# Patient Record
Sex: FEMALE | Race: WHITE | Employment: OTHER | URBAN - METROPOLITAN AREA
[De-identification: names, ages, dates, MRNs, and addresses within clinical notes are randomized per-mention and may not be internally consistent; named-entity substitution may affect disease eponyms.]

---

## 2017-06-21 ENCOUNTER — HOSPITAL ENCOUNTER (OUTPATIENT)
Dept: PREADMISSION TESTING | Age: 72
Discharge: HOME | End: 2017-06-21
Payer: MEDICARE

## 2017-06-21 VITALS
WEIGHT: 148.25 LBS | SYSTOLIC BLOOD PRESSURE: 157 MMHG | OXYGEN SATURATION: 97 % | HEIGHT: 62 IN | BODY MASS INDEX: 27.28 KG/M2 | RESPIRATION RATE: 18 BRPM | HEART RATE: 90 BPM | DIASTOLIC BLOOD PRESSURE: 76 MMHG | TEMPERATURE: 98.4 F

## 2017-06-21 LAB
ALBUMIN SERPL BCP-MCNC: 4 G/DL (ref 3.5–5)
ALBUMIN/GLOB SERPL: 1.4 {RATIO} (ref 1.1–2.2)
ALP SERPL-CCNC: 35 U/L (ref 45–117)
ALT SERPL-CCNC: 31 U/L (ref 12–78)
ANION GAP BLD CALC-SCNC: 9 MMOL/L (ref 5–15)
APPEARANCE UR: CLEAR
APTT PPP: 27 SEC (ref 22.1–32.5)
AST SERPL W P-5'-P-CCNC: 23 U/L (ref 15–37)
ATRIAL RATE: 57 BPM
BACTERIA URNS QL MICRO: NEGATIVE /HPF
BASOPHILS # BLD AUTO: 0.1 K/UL (ref 0–0.1)
BASOPHILS # BLD: 1 % (ref 0–1)
BILIRUB SERPL-MCNC: 0.7 MG/DL (ref 0.2–1)
BILIRUB UR QL: NEGATIVE
BUN SERPL-MCNC: 15 MG/DL (ref 6–20)
BUN/CREAT SERPL: 16 (ref 12–20)
CALCIUM SERPL-MCNC: 9.5 MG/DL (ref 8.5–10.1)
CALCULATED P AXIS, ECG09: 42 DEGREES
CALCULATED R AXIS, ECG10: 2 DEGREES
CALCULATED T AXIS, ECG11: 43 DEGREES
CHLORIDE SERPL-SCNC: 106 MMOL/L (ref 97–108)
CO2 SERPL-SCNC: 30 MMOL/L (ref 21–32)
COLOR UR: ABNORMAL
CREAT SERPL-MCNC: 0.95 MG/DL (ref 0.55–1.02)
DIAGNOSIS, 93000: NORMAL
EOSINOPHIL # BLD: 0.2 K/UL (ref 0–0.4)
EOSINOPHIL NFR BLD: 3 % (ref 0–7)
EPITH CASTS URNS QL MICRO: ABNORMAL /LPF
ERYTHROCYTE [DISTWIDTH] IN BLOOD BY AUTOMATED COUNT: 13.5 % (ref 11.5–14.5)
EST. AVERAGE GLUCOSE BLD GHB EST-MCNC: 137 MG/DL
GLOBULIN SER CALC-MCNC: 2.8 G/DL (ref 2–4)
GLUCOSE SERPL-MCNC: 91 MG/DL (ref 65–100)
GLUCOSE UR STRIP.AUTO-MCNC: NEGATIVE MG/DL
HBA1C MFR BLD: 6.4 % (ref 4.2–6.3)
HCT VFR BLD AUTO: 39.2 % (ref 35–47)
HGB BLD-MCNC: 12.8 G/DL (ref 11.5–16)
HGB UR QL STRIP: NEGATIVE
INR PPP: 1 (ref 0.9–1.1)
KETONES UR QL STRIP.AUTO: 15 MG/DL
LEUKOCYTE ESTERASE UR QL STRIP.AUTO: NEGATIVE
LYMPHOCYTES # BLD AUTO: 29 % (ref 12–49)
LYMPHOCYTES # BLD: 2.3 K/UL (ref 0.8–3.5)
MCH RBC QN AUTO: 28.3 PG (ref 26–34)
MCHC RBC AUTO-ENTMCNC: 32.7 G/DL (ref 30–36.5)
MCV RBC AUTO: 86.5 FL (ref 80–99)
MONOCYTES # BLD: 0.8 K/UL (ref 0–1)
MONOCYTES NFR BLD AUTO: 11 % (ref 5–13)
NEUTS SEG # BLD: 4.4 K/UL (ref 1.8–8)
NEUTS SEG NFR BLD AUTO: 56 % (ref 32–75)
NITRITE UR QL STRIP.AUTO: NEGATIVE
P-R INTERVAL, ECG05: 188 MS
PH UR STRIP: 5.5 [PH] (ref 5–8)
PLATELET # BLD AUTO: 293 K/UL (ref 150–400)
POTASSIUM SERPL-SCNC: 4.4 MMOL/L (ref 3.5–5.1)
PROT SERPL-MCNC: 6.8 G/DL (ref 6.4–8.2)
PROT UR STRIP-MCNC: NEGATIVE MG/DL
PROTHROMBIN TIME: 10.3 SEC (ref 9–11.1)
Q-T INTERVAL, ECG07: 430 MS
QRS DURATION, ECG06: 76 MS
QTC CALCULATION (BEZET), ECG08: 418 MS
RBC # BLD AUTO: 4.53 M/UL (ref 3.8–5.2)
RBC #/AREA URNS HPF: ABNORMAL /HPF (ref 0–5)
SODIUM SERPL-SCNC: 145 MMOL/L (ref 136–145)
SP GR UR REFRACTOMETRY: 1.02 (ref 1–1.03)
THERAPEUTIC RANGE,PTTT: NORMAL SECS (ref 58–77)
UA: UC IF INDICATED,UAUC: ABNORMAL
UROBILINOGEN UR QL STRIP.AUTO: 0.2 EU/DL (ref 0.2–1)
VENTRICULAR RATE, ECG03: 57 BPM
WBC # BLD AUTO: 7.8 K/UL (ref 3.6–11)
WBC URNS QL MICRO: ABNORMAL /HPF (ref 0–4)

## 2017-06-21 PROCEDURE — 80053 COMPREHEN METABOLIC PANEL: CPT | Performed by: ORTHOPAEDIC SURGERY

## 2017-06-21 PROCEDURE — 83036 HEMOGLOBIN GLYCOSYLATED A1C: CPT | Performed by: ORTHOPAEDIC SURGERY

## 2017-06-21 PROCEDURE — 85730 THROMBOPLASTIN TIME PARTIAL: CPT | Performed by: ORTHOPAEDIC SURGERY

## 2017-06-21 PROCEDURE — 36415 COLL VENOUS BLD VENIPUNCTURE: CPT | Performed by: ORTHOPAEDIC SURGERY

## 2017-06-21 PROCEDURE — 93005 ELECTROCARDIOGRAM TRACING: CPT

## 2017-06-21 PROCEDURE — 81001 URINALYSIS AUTO W/SCOPE: CPT | Performed by: ORTHOPAEDIC SURGERY

## 2017-06-21 PROCEDURE — 85025 COMPLETE CBC W/AUTO DIFF WBC: CPT | Performed by: ORTHOPAEDIC SURGERY

## 2017-06-21 PROCEDURE — 85610 PROTHROMBIN TIME: CPT | Performed by: ORTHOPAEDIC SURGERY

## 2017-06-21 RX ORDER — MELOXICAM 15 MG/1
15 TABLET ORAL DAILY
COMMUNITY
End: 2017-07-12

## 2017-06-21 RX ORDER — CHOLECALCIFEROL (VITAMIN D3) 125 MCG
2000 CAPSULE ORAL DAILY
COMMUNITY

## 2017-06-21 RX ORDER — ROSUVASTATIN CALCIUM 5 MG/1
5 TABLET, COATED ORAL DAILY
COMMUNITY

## 2017-06-21 NOTE — H&P
PAT Pre-Op History & Physical    Patient: Marcelo Garcia                  MRN: [id number]          SSN: [id number]  YOB: 1945          Age: 71 y.o. Sex: female                Subjective:   Patient is a 71 y.o.  female who presents with history of chronic right knee pain that has been a problem for \"years\" but has escalated over the last 1-2 months. Rates her knee pain 5/10 and characterizes it as a constant ache, especially at night. States that prolonged walking exacerbates her knee pain. Has failed steroid injections, gel injections, NSAIDs, Tylenol, and application of ice. The patient was evaluated in the surgeon's office and it was determined that the most appropriate plan of care is to proceed with surgical intervention. Patient's PCP Pamela Anne MD      Past Medical History:   Diagnosis Date    Arthritis     osteopenia    Chronic pain     knees/hips    High cholesterol     Ill-defined condition 06/21/2017    Over weight  BMI= 27.1    Insomnia     Restless leg syndrome     Urinary incontinence     stress /urge      Past Surgical History:   Procedure Laterality Date    HX GI      colonoscopy    HX GYN      vaginal sling    HX HEENT      Tonsilectomy as a child    HX ORTHOPAEDIC      local anesthesia- Trigger fingers bilat hands      Prior to Admission medications    Medication Sig Start Date End Date Taking? Authorizing Provider   meloxicam (MOBIC) 15 mg tablet Take 15 mg by mouth daily. Indications: with food   Yes Historical Provider   cholecalciferol, vitamin D3, (VITAMIN D3) 2,000 unit tab Take 2,000 Units by mouth daily. Yes Historical Provider   OTHER Indications: Pt states she takes calcium not sure of dose   Yes Historical Provider   rosuvastatin (CRESTOR) 5 mg tablet Take 5 mg by mouth daily. Yes Historical Provider     Current Outpatient Prescriptions   Medication Sig    meloxicam (MOBIC) 15 mg tablet Take 15 mg by mouth daily.  Indications: with food    cholecalciferol, vitamin D3, (VITAMIN D3) 2,000 unit tab Take 2,000 Units by mouth daily.  OTHER Indications: Pt states she takes calcium not sure of dose    rosuvastatin (CRESTOR) 5 mg tablet Take 5 mg by mouth daily. No current facility-administered medications for this encounter. No Known Allergies   Social History   Substance Use Topics    Smoking status: Never Smoker    Smokeless tobacco: Never Used    Alcohol use No      History   Drug Use No     Family History   Problem Relation Age of Onset    Stroke Mother     Arthritis-osteo Father     Prostate Cancer Father     Other Sister      spinal stenosis    Arthritis-osteo Sister     Prostate Cancer Brother     Arthritis-osteo Brother     Prostate Cancer Brother     Arthritis-osteo Brother          Review of Systems    Patient denies difficulty swallowing, mouth sores, or loose teeth. Patient denies any recent dental procedures or any planned prior to surgery. Patient denies chest pain, tightness, pain radiating down left arm, palpitations. Denies dizziness, visual disturbances, or lightheadedness. Patient denies shortness of breath, wheezing, cough, fever, or chills. Patient denies diarrhea, constipation, or abdominal pain. Patient denies urinary problems including dysuria, hesitancy, urgency, or incontinence. Denies skin breakdown, rashes, insect bites or open area. C/o right knee pain. Objective:   Patient Vitals for the past 24 hrs:   Temp Pulse Resp BP SpO2   06/21/17 0940 98.4 °F (36.9 °C) 90 18 157/76 97 %       Body mass index is 27.12 kg/(m^2). Wt Readings from Last 1 Encounters:   06/21/17 67.2 kg (148 lb 4 oz)        Physical Exam:     General: Pleasant,  cooperative, no apparent distress, appears stated age. Eyes: Conjunctivae/corneas clear. EOMs intact. Nose: Nares normal.   Mouth/Throat: Lips, mucosa, and tongue normal. Teeth and gums normal.   Neck: Supple, symmetrical, trachea midline.     Back: Symmetric   Lungs: Clear to auscultation bilaterally. Heart: Regular rate and rhythm, S1, S2 normal. No murmur, click, rub or gallop. Abdomen: Soft, non-tender. Bowel sounds normal. No distention. Musculoskeletal:  Gait is antalgic. + crepitus right knee. Extremities:  Extremities normal, atraumatic, no cyanosis or edema. Calves                                 supple, non tender to palpation. Pulses: 2+ and symmetric bilateral upper extremities. Cap. refill <2 seconds   Skin: Skin color, texture, turgor normal.  No rashes or lesions. Neurologic: CN II-XII grossly intact. Alert and oriented x3. Labs:   Recent Results (from the past 72 hour(s))   EKG, 12 LEAD, INITIAL    Collection Time: 06/21/17 12:17 PM   Result Value Ref Range    Ventricular Rate 57 BPM    Atrial Rate 57 BPM    P-R Interval 188 ms    QRS Duration 76 ms    Q-T Interval 430 ms    QTC Calculation (Bezet) 418 ms    Calculated P Axis 42 degrees    Calculated R Axis 2 degrees    Calculated T Axis 43 degrees    Diagnosis       Sinus bradycardia  Otherwise normal ECG  No previous ECGs available  Confirmed by Juanito Robles MD ([id number]) on 6/21/2017 3:14:00 PM     CBC WITH AUTOMATED DIFF    Collection Time: 06/21/17 12:57 PM   Result Value Ref Range    WBC 7.8 3.6 - 11.0 K/uL    RBC 4.53 3.80 - 5.20 M/uL    HGB 12.8 11.5 - 16.0 g/dL    HCT 39.2 35.0 - 47.0 %    MCV 86.5 80.0 - 99.0 FL    MCH 28.3 26.0 - 34.0 PG    MCHC 32.7 30.0 - 36.5 g/dL    RDW 13.5 11.5 - 14.5 %    PLATELET 972 107 - 216 K/uL    NEUTROPHILS 56 32 - 75 %    LYMPHOCYTES 29 12 - 49 %    MONOCYTES 11 5 - 13 %    EOSINOPHILS 3 0 - 7 %    BASOPHILS 1 0 - 1 %    ABS. NEUTROPHILS 4.4 1.8 - 8.0 K/UL    ABS. LYMPHOCYTES 2.3 0.8 - 3.5 K/UL    ABS. MONOCYTES 0.8 0.0 - 1.0 K/UL    ABS. EOSINOPHILS 0.2 0.0 - 0.4 K/UL    ABS.  BASOPHILS 0.1 0.0 - 0.1 K/UL   METABOLIC PANEL, COMPREHENSIVE    Collection Time: 06/21/17 12:57 PM   Result Value Ref Range    Sodium 145 136 - 145 mmol/L Potassium 4.4 3.5 - 5.1 mmol/L    Chloride 106 97 - 108 mmol/L    CO2 30 21 - 32 mmol/L    Anion gap 9 5 - 15 mmol/L    Glucose 91 65 - 100 mg/dL    BUN 15 6 - 20 MG/DL    Creatinine 0.95 0.55 - 1.02 MG/DL    BUN/Creatinine ratio 16 12 - 20      GFR est AA >60 >60 ml/min/1.73m2    GFR est non-AA 58 (L) >60 ml/min/1.73m2    Calcium 9.5 8.5 - 10.1 MG/DL    Bilirubin, total 0.7 0.2 - 1.0 MG/DL    ALT (SGPT) 31 12 - 78 U/L    AST (SGOT) 23 15 - 37 U/L    Alk.  phosphatase 35 (L) 45 - 117 U/L    Protein, total 6.8 6.4 - 8.2 g/dL    Albumin 4.0 3.5 - 5.0 g/dL    Globulin 2.8 2.0 - 4.0 g/dL    A-G Ratio 1.4 1.1 - 2.2     HEMOGLOBIN A1C WITH EAG    Collection Time: 06/21/17 12:57 PM   Result Value Ref Range    Hemoglobin A1c 6.4 (H) 4.2 - 6.3 %    Est. average glucose 137 mg/dL   CULTURE, MRSA    Collection Time: 06/21/17 12:57 PM   Result Value Ref Range    Special Requests: NO SPECIAL REQUESTS      Culture result: MRSA NOT PRESENT  AT 13 HOURS       PROTHROMBIN TIME + INR    Collection Time: 06/21/17 12:57 PM   Result Value Ref Range    INR 1.0 0.9 - 1.1      Prothrombin time 10.3 9.0 - 11.1 sec   PTT    Collection Time: 06/21/17 12:57 PM   Result Value Ref Range    aPTT 27.0 22.1 - 32.5 sec    aPTT, therapeutic range     58.0 - 77.0 SECS   URINALYSIS W/ REFLEX CULTURE    Collection Time: 06/21/17 12:57 PM   Result Value Ref Range    Color YELLOW/STRAW      Appearance CLEAR CLEAR      Specific gravity 1.021 1.003 - 1.030      pH (UA) 5.5 5.0 - 8.0      Protein NEGATIVE  NEG mg/dL    Glucose NEGATIVE  NEG mg/dL    Ketone 15 (A) NEG mg/dL    Bilirubin NEGATIVE  NEG      Blood NEGATIVE  NEG      Urobilinogen 0.2 0.2 - 1.0 EU/dL    Nitrites NEGATIVE  NEG      Leukocyte Esterase NEGATIVE  NEG      WBC 0-4 0 - 4 /hpf    RBC 0-5 0 - 5 /hpf    Epithelial cells FEW FEW /lpf    Bacteria NEGATIVE  NEG /hpf    UA:UC IF INDICATED CULTURE NOT INDICATED BY UA RESULT CNI         Assessment:     DJD    Plan:     Scheduled for Right press fit total knee arthroplasty. Labs and EKG done per surgeon's orders. LAb results and EKG reviewed- unremarkable. MRSA pending. Attended joint class 6/21/2017.     Karol Sol NP

## 2017-06-21 NOTE — PERIOP NOTES
[facility]  PREOPERATIVE INSTRUCTIONS    Surgery Date:   7/10/17  Surgery arrival time given by surgeon: NO   If no,[facility] [address] staff will call you between 4 PM- 8 PM the day before surgery with your arrival time. If your surgery is on a Monday, we will call you the preceding Friday. Please call [phone] after 8 PM if you did not receive your arrival time. 1. Please report at the designated time to the [facility] [facility]. Bring your insurance card, photo identification, and any copayment ( if applicable). 2. You must have a responsible adult to drive you home. You need to have a responsible adult to stay with you the first 24 hours after surgery if you are going home the same day of your surgery and you should not drive a car for 24 hours following your surgery. 3. Nothing to eat or drink after midnight the night before surgery. This includes no water, gum, mints, coffee, juice, etc.  Please note special instructions, if applicable, below for medications. 4. MEDICATIONS TO TAKE THE MORNING OF SURGERY WITH A SIP OF WATER:  5. No alcoholic beverages 24 hours before or after your surgery. 6. If you are being admitted to the hospital,please leave personal belongings/luggage in your car until you have an assigned hospital room number. 7. Stop Aspirin and/or any non-steroidal anti-inflammatory drugs (i.e. Ibuprofen, Naproxen, Advil, Aleve) as directed by your surgeon. You may take Tylenol. Stop herbal supplements 1 week prior to  surgery. 8. If you are currently taking Plavix, Coumadin,or any other blood-thinning/anticoagulant medication contact your surgeon for instructions. 9. Please wear comfortable clothes. Wear your glasses instead of contacts. We ask that all money, jewelry and valuables be left at home. Wear no make up, particularly mascara, the day of surgery. 10.  All body piercings, rings,and jewelry need to be removed and left at home. Please wear your hair loose or down.  Please no pony-tails, buns, or any metal hair accessories. If you shower the morning of surgery, please do not apply any lotions, powders, or deodorants afterwards. Do not shave any body area within 24 hours of your surgery. 11. Please follow all instructions to avoid any potential surgical cancellation. 12.  Should your physical condition change, (i.e. fever, cold, flu, etc.) please notify your surgeon as soon as possible. 13. It is important to be on time. If a situation occurs where you may be delayed, please call:  [phone] / [phone] on the day of surgery. 14. The Preadmission Testing staff can be reached at [phone]. .  15. Special instructions:bring calcium bottle so nurse can verify    The patient was contacted  in person. She  verbalize  understanding of all instructions does not  need reinforcement.

## 2017-06-22 LAB
BACTERIA SPEC CULT: NORMAL
BACTERIA SPEC CULT: NORMAL
SERVICE CMNT-IMP: NORMAL

## 2017-06-22 NOTE — PROGRESS NOTES
Problem: Patient Education: Go to Patient Education Activity  Goal: Patient/Family Education  Outcome: Progressing Towards Goal  The patient attended the pre-operative joint replacement class. The content of the class, using a power-point presentation as well as visual demonstration was specific for patients undergoing total knee and total hip replacements. A pre-operative education booklet was provided to all patients. At the conclusion of class an opportunity was offered for any question or concerns the patient or family may have regarding their upcoming scheduled procedure. Patient attended class on 6/21/17, no  present.

## 2017-07-10 ENCOUNTER — ANESTHESIA EVENT (OUTPATIENT)
Dept: SURGERY | Age: 72
DRG: 470 | End: 2017-07-10
Payer: MEDICARE

## 2017-07-10 ENCOUNTER — APPOINTMENT (OUTPATIENT)
Dept: GENERAL RADIOLOGY | Age: 72
DRG: 470 | End: 2017-07-10
Attending: ORTHOPAEDIC SURGERY
Payer: MEDICARE

## 2017-07-10 ENCOUNTER — ANESTHESIA (OUTPATIENT)
Dept: SURGERY | Age: 72
DRG: 470 | End: 2017-07-10
Payer: MEDICARE

## 2017-07-10 ENCOUNTER — HOSPITAL ENCOUNTER (INPATIENT)
Age: 72
LOS: 2 days | Discharge: HOME | DRG: 470 | End: 2017-07-12
Attending: ORTHOPAEDIC SURGERY | Admitting: ORTHOPAEDIC SURGERY
Payer: MEDICARE

## 2017-07-10 PROBLEM — M17.9 OA (OSTEOARTHRITIS) OF KNEE: Status: ACTIVE | Noted: 2017-07-10

## 2017-07-10 PROCEDURE — 74011250636 HC RX REV CODE- 250/636: Performed by: ORTHOPAEDIC SURGERY

## 2017-07-10 PROCEDURE — 74011250636 HC RX REV CODE- 250/636: Performed by: ANESTHESIOLOGY

## 2017-07-10 PROCEDURE — 74011000250 HC RX REV CODE- 250: Performed by: ANESTHESIOLOGY

## 2017-07-10 PROCEDURE — C1713 ANCHOR/SCREW BN/BN,TIS/BN: HCPCS | Performed by: ORTHOPAEDIC SURGERY

## 2017-07-10 PROCEDURE — 76010000162 HC OR TIME 1.5 TO 2 HR INTENSV-TIER 1: Performed by: ORTHOPAEDIC SURGERY

## 2017-07-10 PROCEDURE — 97530 THERAPEUTIC ACTIVITIES: CPT

## 2017-07-10 PROCEDURE — 0SRC0J9 REPLACEMENT OF RIGHT KNEE JOINT WITH SYNTHETIC SUBSTITUTE, CEMENTED, OPEN APPROACH: ICD-10-PCS | Performed by: ORTHOPAEDIC SURGERY

## 2017-07-10 PROCEDURE — 74011000250 HC RX REV CODE- 250

## 2017-07-10 PROCEDURE — 77030013837 HC NERV BLK KT BBMI -B

## 2017-07-10 PROCEDURE — 77030035236 HC SUT PDS STRATFX BARB J&J -B: Performed by: ORTHOPAEDIC SURGERY

## 2017-07-10 PROCEDURE — 74011250637 HC RX REV CODE- 250/637: Performed by: ORTHOPAEDIC SURGERY

## 2017-07-10 PROCEDURE — 77030003029 HC SUT VCRL J&J -B: Performed by: ORTHOPAEDIC SURGERY

## 2017-07-10 PROCEDURE — 76210000016 HC OR PH I REC 1 TO 1.5 HR: Performed by: ORTHOPAEDIC SURGERY

## 2017-07-10 PROCEDURE — 77030019945 HC PDNG CST 3M -A: Performed by: ORTHOPAEDIC SURGERY

## 2017-07-10 PROCEDURE — 77030020143 HC AIRWY LARYN INTUB CGAS -A: Performed by: ANESTHESIOLOGY

## 2017-07-10 PROCEDURE — 74011250636 HC RX REV CODE- 250/636

## 2017-07-10 PROCEDURE — 74011250637 HC RX REV CODE- 250/637: Performed by: ANESTHESIOLOGY

## 2017-07-10 PROCEDURE — C1776 JOINT DEVICE (IMPLANTABLE): HCPCS | Performed by: ORTHOPAEDIC SURGERY

## 2017-07-10 PROCEDURE — 74011000258 HC RX REV CODE- 258

## 2017-07-10 PROCEDURE — 77030031139 HC SUT VCRL2 J&J -A: Performed by: ORTHOPAEDIC SURGERY

## 2017-07-10 PROCEDURE — 77030000032 HC CUF TRNQT ZIMM -B: Performed by: ORTHOPAEDIC SURGERY

## 2017-07-10 PROCEDURE — 77030010785: Performed by: ORTHOPAEDIC SURGERY

## 2017-07-10 PROCEDURE — 77030016547 HC BLD SAW SAG1 STRY -B: Performed by: ORTHOPAEDIC SURGERY

## 2017-07-10 PROCEDURE — 77030020788: Performed by: ORTHOPAEDIC SURGERY

## 2017-07-10 PROCEDURE — 77030002933 HC SUT MCRYL J&J -A: Performed by: ORTHOPAEDIC SURGERY

## 2017-07-10 PROCEDURE — 77030018836 HC SOL IRR NACL ICUM -A: Performed by: ORTHOPAEDIC SURGERY

## 2017-07-10 PROCEDURE — 97116 GAIT TRAINING THERAPY: CPT

## 2017-07-10 PROCEDURE — 77030011640 HC PAD GRND REM COVD -A: Performed by: ORTHOPAEDIC SURGERY

## 2017-07-10 PROCEDURE — 74011000250 HC RX REV CODE- 250: Performed by: ORTHOPAEDIC SURGERY

## 2017-07-10 PROCEDURE — 74011000272 HC RX REV CODE- 272: Performed by: ORTHOPAEDIC SURGERY

## 2017-07-10 PROCEDURE — 3E0T3BZ INTRODUCTION OF ANESTHETIC AGENT INTO PERIPHERAL NERVES AND PLEXI, PERCUTANEOUS APPROACH: ICD-10-PCS | Performed by: ANESTHESIOLOGY

## 2017-07-10 PROCEDURE — 77030010507 HC ADH SKN DERMBND J&J -B

## 2017-07-10 PROCEDURE — 73560 X-RAY EXAM OF KNEE 1 OR 2: CPT

## 2017-07-10 PROCEDURE — 77030003601 HC NDL NRV BLK BBMI -A

## 2017-07-10 PROCEDURE — 76060000034 HC ANESTHESIA 1.5 TO 2 HR: Performed by: ORTHOPAEDIC SURGERY

## 2017-07-10 PROCEDURE — 97161 PT EVAL LOW COMPLEX 20 MIN: CPT

## 2017-07-10 PROCEDURE — 64445 NJX AA&/STRD SCIATIC NRV IMG: CPT

## 2017-07-10 PROCEDURE — 77030027138 HC INCENT SPIROMETER -A

## 2017-07-10 PROCEDURE — 77030020782 HC GWN BAIR PAWS FLX 3M -B

## 2017-07-10 PROCEDURE — 65270000029 HC RM PRIVATE

## 2017-07-10 PROCEDURE — 64448 NJX AA&/STRD FEM NRV NFS IMG: CPT

## 2017-07-10 PROCEDURE — 77030034479 HC ADH SKN CLSR PRINEO J&J -B: Performed by: ORTHOPAEDIC SURGERY

## 2017-07-10 DEVICE — KNEE POR FEM/RGX TIB/ARC/PAT -- VANGUARD K8: Type: IMPLANTABLE DEVICE | Status: FUNCTIONAL

## 2017-07-10 DEVICE — COMPONENT PAT DIA28MM THK8MM STD KNEE TI ALLY S STL UHMWPE: Type: IMPLANTABLE DEVICE | Site: KNEE | Status: FUNCTIONAL

## 2017-07-10 DEVICE — CEMENT BNE SIMPLEX W/GENT -- 10/PK: Type: IMPLANTABLE DEVICE | Site: KNEE | Status: FUNCTIONAL

## 2017-07-10 DEVICE — IMPLANTABLE DEVICE: Type: IMPLANTABLE DEVICE | Site: KNEE | Status: FUNCTIONAL

## 2017-07-10 DEVICE — STEM TIB L40MM KNEE PRI FIN VANGUARD COMPLT SYS: Type: IMPLANTABLE DEVICE | Site: KNEE | Status: FUNCTIONAL

## 2017-07-10 RX ORDER — ROPIVACAINE HYDROCHLORIDE 2 MG/ML
INJECTION, SOLUTION EPIDURAL; INFILTRATION; PERINEURAL AS NEEDED
Status: DISCONTINUED | OUTPATIENT
Start: 2017-07-10 | End: 2017-07-10 | Stop reason: HOSPADM

## 2017-07-10 RX ORDER — FENTANYL CITRATE 50 UG/ML
25 INJECTION, SOLUTION INTRAMUSCULAR; INTRAVENOUS
Status: DISCONTINUED | OUTPATIENT
Start: 2017-07-10 | End: 2017-07-10 | Stop reason: HOSPADM

## 2017-07-10 RX ORDER — SODIUM CHLORIDE 0.9 % (FLUSH) 0.9 %
5-10 SYRINGE (ML) INJECTION AS NEEDED
Status: DISCONTINUED | OUTPATIENT
Start: 2017-07-10 | End: 2017-07-10 | Stop reason: HOSPADM

## 2017-07-10 RX ORDER — ACETAMINOPHEN 325 MG/1
650 TABLET ORAL EVERY 6 HOURS
Status: DISCONTINUED | OUTPATIENT
Start: 2017-07-10 | End: 2017-07-12 | Stop reason: HOSPADM

## 2017-07-10 RX ORDER — AMOXICILLIN 250 MG
1 CAPSULE ORAL 2 TIMES DAILY
Status: DISCONTINUED | OUTPATIENT
Start: 2017-07-10 | End: 2017-07-12 | Stop reason: HOSPADM

## 2017-07-10 RX ORDER — POLYETHYLENE GLYCOL 3350 17 G/17G
17 POWDER, FOR SOLUTION ORAL DAILY
Status: DISCONTINUED | OUTPATIENT
Start: 2017-07-11 | End: 2017-07-12 | Stop reason: HOSPADM

## 2017-07-10 RX ORDER — HYDROMORPHONE HYDROCHLORIDE 1 MG/ML
0.5 INJECTION, SOLUTION INTRAMUSCULAR; INTRAVENOUS; SUBCUTANEOUS
Status: ACTIVE | OUTPATIENT
Start: 2017-07-10 | End: 2017-07-11

## 2017-07-10 RX ORDER — HYDROXYZINE HYDROCHLORIDE 10 MG/1
10 TABLET, FILM COATED ORAL
Status: DISCONTINUED | OUTPATIENT
Start: 2017-07-10 | End: 2017-07-12 | Stop reason: HOSPADM

## 2017-07-10 RX ORDER — ONDANSETRON 2 MG/ML
INJECTION INTRAMUSCULAR; INTRAVENOUS AS NEEDED
Status: DISCONTINUED | OUTPATIENT
Start: 2017-07-10 | End: 2017-07-10 | Stop reason: HOSPADM

## 2017-07-10 RX ORDER — OXYCODONE HYDROCHLORIDE 5 MG/1
10 TABLET ORAL
Status: DISCONTINUED | OUTPATIENT
Start: 2017-07-10 | End: 2017-07-12 | Stop reason: HOSPADM

## 2017-07-10 RX ORDER — OXYCODONE HYDROCHLORIDE 5 MG/1
5 TABLET ORAL
Status: DISCONTINUED | OUTPATIENT
Start: 2017-07-10 | End: 2017-07-12 | Stop reason: HOSPADM

## 2017-07-10 RX ORDER — LIDOCAINE HYDROCHLORIDE 20 MG/ML
INJECTION, SOLUTION EPIDURAL; INFILTRATION; INTRACAUDAL; PERINEURAL AS NEEDED
Status: DISCONTINUED | OUTPATIENT
Start: 2017-07-10 | End: 2017-07-10 | Stop reason: HOSPADM

## 2017-07-10 RX ORDER — SODIUM CHLORIDE 0.9 % (FLUSH) 0.9 %
5-10 SYRINGE (ML) INJECTION EVERY 8 HOURS
Status: DISCONTINUED | OUTPATIENT
Start: 2017-07-10 | End: 2017-07-10 | Stop reason: HOSPADM

## 2017-07-10 RX ORDER — SODIUM CHLORIDE, SODIUM LACTATE, POTASSIUM CHLORIDE, CALCIUM CHLORIDE 600; 310; 30; 20 MG/100ML; MG/100ML; MG/100ML; MG/100ML
100 INJECTION, SOLUTION INTRAVENOUS CONTINUOUS
Status: DISCONTINUED | OUTPATIENT
Start: 2017-07-10 | End: 2017-07-10 | Stop reason: HOSPADM

## 2017-07-10 RX ORDER — ONDANSETRON 2 MG/ML
4 INJECTION INTRAMUSCULAR; INTRAVENOUS
Status: DISCONTINUED | OUTPATIENT
Start: 2017-07-10 | End: 2017-07-12 | Stop reason: HOSPADM

## 2017-07-10 RX ORDER — MIDAZOLAM HYDROCHLORIDE 1 MG/ML
INJECTION, SOLUTION INTRAMUSCULAR; INTRAVENOUS AS NEEDED
Status: DISCONTINUED | OUTPATIENT
Start: 2017-07-10 | End: 2017-07-10 | Stop reason: HOSPADM

## 2017-07-10 RX ORDER — TRAMADOL HYDROCHLORIDE 50 MG/1
50 TABLET ORAL
Status: DISCONTINUED | OUTPATIENT
Start: 2017-07-10 | End: 2017-07-12 | Stop reason: HOSPADM

## 2017-07-10 RX ORDER — ROPIVACAINE HYDROCHLORIDE 5 MG/ML
INJECTION, SOLUTION EPIDURAL; INFILTRATION; PERINEURAL AS NEEDED
Status: DISCONTINUED | OUTPATIENT
Start: 2017-07-10 | End: 2017-07-10 | Stop reason: HOSPADM

## 2017-07-10 RX ORDER — ROSUVASTATIN CALCIUM 10 MG/1
5 TABLET, COATED ORAL DAILY
Status: DISCONTINUED | OUTPATIENT
Start: 2017-07-11 | End: 2017-07-12 | Stop reason: HOSPADM

## 2017-07-10 RX ORDER — CELECOXIB 100 MG/1
200 CAPSULE ORAL EVERY 12 HOURS
Status: DISCONTINUED | OUTPATIENT
Start: 2017-07-10 | End: 2017-07-12 | Stop reason: HOSPADM

## 2017-07-10 RX ORDER — OXYCODONE HCL 10 MG/1
10 TABLET, FILM COATED, EXTENDED RELEASE ORAL EVERY 12 HOURS
Status: COMPLETED | OUTPATIENT
Start: 2017-07-10 | End: 2017-07-11

## 2017-07-10 RX ORDER — PROPOFOL 10 MG/ML
INJECTION, EMULSION INTRAVENOUS AS NEEDED
Status: DISCONTINUED | OUTPATIENT
Start: 2017-07-10 | End: 2017-07-10 | Stop reason: HOSPADM

## 2017-07-10 RX ORDER — KETOROLAC TROMETHAMINE 30 MG/ML
15 INJECTION, SOLUTION INTRAMUSCULAR; INTRAVENOUS
Status: COMPLETED | OUTPATIENT
Start: 2017-07-10 | End: 2017-07-10

## 2017-07-10 RX ORDER — FAMOTIDINE 20 MG/1
20 TABLET, FILM COATED ORAL DAILY
Status: DISCONTINUED | OUTPATIENT
Start: 2017-07-11 | End: 2017-07-12 | Stop reason: HOSPADM

## 2017-07-10 RX ORDER — FENTANYL CITRATE 50 UG/ML
INJECTION, SOLUTION INTRAMUSCULAR; INTRAVENOUS AS NEEDED
Status: DISCONTINUED | OUTPATIENT
Start: 2017-07-10 | End: 2017-07-10

## 2017-07-10 RX ORDER — SODIUM CHLORIDE 9 MG/ML
125 INJECTION, SOLUTION INTRAVENOUS CONTINUOUS
Status: DISPENSED | OUTPATIENT
Start: 2017-07-10 | End: 2017-07-11

## 2017-07-10 RX ORDER — CEFAZOLIN SODIUM IN 0.9 % NACL 2 G/50 ML
2 INTRAVENOUS SOLUTION, PIGGYBACK (ML) INTRAVENOUS
Status: COMPLETED | OUTPATIENT
Start: 2017-07-10 | End: 2017-07-10

## 2017-07-10 RX ORDER — DEXAMETHASONE SODIUM PHOSPHATE 4 MG/ML
INJECTION, SOLUTION INTRA-ARTICULAR; INTRALESIONAL; INTRAMUSCULAR; INTRAVENOUS; SOFT TISSUE AS NEEDED
Status: DISCONTINUED | OUTPATIENT
Start: 2017-07-10 | End: 2017-07-10 | Stop reason: HOSPADM

## 2017-07-10 RX ORDER — SODIUM CHLORIDE 0.9 % (FLUSH) 0.9 %
5-10 SYRINGE (ML) INJECTION AS NEEDED
Status: DISCONTINUED | OUTPATIENT
Start: 2017-07-10 | End: 2017-07-12 | Stop reason: HOSPADM

## 2017-07-10 RX ORDER — OXYCODONE HYDROCHLORIDE 5 MG/1
5 TABLET ORAL
Status: DISCONTINUED | OUTPATIENT
Start: 2017-07-10 | End: 2017-07-10 | Stop reason: HOSPADM

## 2017-07-10 RX ORDER — ACETAMINOPHEN 325 MG/1
975 TABLET ORAL ONCE
Status: COMPLETED | OUTPATIENT
Start: 2017-07-10 | End: 2017-07-10

## 2017-07-10 RX ORDER — HYDROMORPHONE HYDROCHLORIDE 1 MG/ML
.25-1 INJECTION, SOLUTION INTRAMUSCULAR; INTRAVENOUS; SUBCUTANEOUS
Status: DISCONTINUED | OUTPATIENT
Start: 2017-07-10 | End: 2017-07-10 | Stop reason: HOSPADM

## 2017-07-10 RX ORDER — FENTANYL CITRATE 50 UG/ML
INJECTION, SOLUTION INTRAMUSCULAR; INTRAVENOUS AS NEEDED
Status: DISCONTINUED | OUTPATIENT
Start: 2017-07-10 | End: 2017-07-10 | Stop reason: HOSPADM

## 2017-07-10 RX ORDER — SODIUM CHLORIDE 0.9 % (FLUSH) 0.9 %
5-10 SYRINGE (ML) INJECTION EVERY 8 HOURS
Status: DISCONTINUED | OUTPATIENT
Start: 2017-07-10 | End: 2017-07-12 | Stop reason: HOSPADM

## 2017-07-10 RX ORDER — LIDOCAINE HYDROCHLORIDE 10 MG/ML
0.1 INJECTION, SOLUTION EPIDURAL; INFILTRATION; INTRACAUDAL; PERINEURAL AS NEEDED
Status: DISCONTINUED | OUTPATIENT
Start: 2017-07-10 | End: 2017-07-10 | Stop reason: HOSPADM

## 2017-07-10 RX ORDER — CEFAZOLIN SODIUM IN 0.9 % NACL 2 G/50 ML
2 INTRAVENOUS SOLUTION, PIGGYBACK (ML) INTRAVENOUS EVERY 8 HOURS
Status: COMPLETED | OUTPATIENT
Start: 2017-07-10 | End: 2017-07-11

## 2017-07-10 RX ORDER — FACIAL-BODY WIPES
10 EACH TOPICAL DAILY PRN
Status: DISCONTINUED | OUTPATIENT
Start: 2017-07-11 | End: 2017-07-12 | Stop reason: HOSPADM

## 2017-07-10 RX ORDER — NALOXONE HYDROCHLORIDE 0.4 MG/ML
0.4 INJECTION, SOLUTION INTRAMUSCULAR; INTRAVENOUS; SUBCUTANEOUS AS NEEDED
Status: DISCONTINUED | OUTPATIENT
Start: 2017-07-10 | End: 2017-07-12 | Stop reason: HOSPADM

## 2017-07-10 RX ADMIN — KETOROLAC TROMETHAMINE 15 MG: 30 INJECTION, SOLUTION INTRAMUSCULAR at 12:34

## 2017-07-10 RX ADMIN — SODIUM CHLORIDE, SODIUM LACTATE, POTASSIUM CHLORIDE, AND CALCIUM CHLORIDE: 600; 310; 30; 20 INJECTION, SOLUTION INTRAVENOUS at 11:36

## 2017-07-10 RX ADMIN — FENTANYL CITRATE 50 MCG: 50 INJECTION, SOLUTION INTRAMUSCULAR; INTRAVENOUS at 09:09

## 2017-07-10 RX ADMIN — ACETAMINOPHEN 975 MG: 325 TABLET ORAL at 08:24

## 2017-07-10 RX ADMIN — PROPOFOL 20 MG: 10 INJECTION, EMULSION INTRAVENOUS at 10:26

## 2017-07-10 RX ADMIN — ROPIVACAINE HYDROCHLORIDE 30 ML: 5 INJECTION, SOLUTION EPIDURAL; INFILTRATION; PERINEURAL at 09:14

## 2017-07-10 RX ADMIN — RIVAROXABAN 10 MG: 10 TABLET, FILM COATED ORAL at 18:13

## 2017-07-10 RX ADMIN — ONDANSETRON 4 MG: 2 INJECTION INTRAMUSCULAR; INTRAVENOUS at 11:26

## 2017-07-10 RX ADMIN — SODIUM CHLORIDE, SODIUM LACTATE, POTASSIUM CHLORIDE, AND CALCIUM CHLORIDE 100 ML/HR: 600; 310; 30; 20 INJECTION, SOLUTION INTRAVENOUS at 08:24

## 2017-07-10 RX ADMIN — OXYCODONE HYDROCHLORIDE 10 MG: 10 TABLET, FILM COATED, EXTENDED RELEASE ORAL at 08:25

## 2017-07-10 RX ADMIN — SODIUM CHLORIDE 125 ML/HR: 900 INJECTION, SOLUTION INTRAVENOUS at 20:54

## 2017-07-10 RX ADMIN — MIDAZOLAM HYDROCHLORIDE 2 MG: 1 INJECTION, SOLUTION INTRAMUSCULAR; INTRAVENOUS at 10:19

## 2017-07-10 RX ADMIN — SODIUM CHLORIDE 125 ML/HR: 900 INJECTION, SOLUTION INTRAVENOUS at 12:13

## 2017-07-10 RX ADMIN — FENTANYL CITRATE 25 MCG: 50 INJECTION, SOLUTION INTRAMUSCULAR; INTRAVENOUS at 10:50

## 2017-07-10 RX ADMIN — CELECOXIB 200 MG: 100 CAPSULE ORAL at 08:25

## 2017-07-10 RX ADMIN — OXYCODONE HYDROCHLORIDE 10 MG: 10 TABLET, FILM COATED, EXTENDED RELEASE ORAL at 20:54

## 2017-07-10 RX ADMIN — FENTANYL CITRATE 25 MCG: 50 INJECTION, SOLUTION INTRAMUSCULAR; INTRAVENOUS at 11:14

## 2017-07-10 RX ADMIN — CEFAZOLIN 2 G: 1 INJECTION, POWDER, FOR SOLUTION INTRAMUSCULAR; INTRAVENOUS; PARENTERAL at 10:39

## 2017-07-10 RX ADMIN — HYDROMORPHONE HYDROCHLORIDE 0.5 MG: 1 INJECTION, SOLUTION INTRAMUSCULAR; INTRAVENOUS; SUBCUTANEOUS at 12:27

## 2017-07-10 RX ADMIN — FENTANYL CITRATE 25 MCG: 50 INJECTION, SOLUTION INTRAMUSCULAR; INTRAVENOUS at 12:35

## 2017-07-10 RX ADMIN — FENTANYL CITRATE 25 MCG: 50 INJECTION, SOLUTION INTRAMUSCULAR; INTRAVENOUS at 12:40

## 2017-07-10 RX ADMIN — MIDAZOLAM HYDROCHLORIDE 1 MG: 1 INJECTION, SOLUTION INTRAMUSCULAR; INTRAVENOUS at 09:11

## 2017-07-10 RX ADMIN — ACETAMINOPHEN 650 MG: 325 TABLET ORAL at 23:58

## 2017-07-10 RX ADMIN — CELECOXIB 200 MG: 100 CAPSULE ORAL at 20:54

## 2017-07-10 RX ADMIN — ROPIVACAINE HYDROCHLORIDE 20 ML: 2 INJECTION, SOLUTION EPIDURAL; INFILTRATION; PERINEURAL at 09:18

## 2017-07-10 RX ADMIN — ACETAMINOPHEN 650 MG: 325 TABLET ORAL at 18:13

## 2017-07-10 RX ADMIN — ROPIVACAINE HYDROCHLORIDE 10 ML: 5 INJECTION, SOLUTION EPIDURAL; INFILTRATION; PERINEURAL at 12:27

## 2017-07-10 RX ADMIN — Medication 10 ML: at 14:52

## 2017-07-10 RX ADMIN — DEXAMETHASONE SODIUM PHOSPHATE 4 MG: 4 INJECTION, SOLUTION INTRA-ARTICULAR; INTRALESIONAL; INTRAMUSCULAR; INTRAVENOUS; SOFT TISSUE at 10:30

## 2017-07-10 RX ADMIN — MIDAZOLAM HYDROCHLORIDE 2 MG: 1 INJECTION, SOLUTION INTRAMUSCULAR; INTRAVENOUS at 09:09

## 2017-07-10 RX ADMIN — CEFAZOLIN 2 G: 1 INJECTION, POWDER, FOR SOLUTION INTRAMUSCULAR; INTRAVENOUS; PARENTERAL at 17:03

## 2017-07-10 RX ADMIN — LIDOCAINE HYDROCHLORIDE 60 MG: 20 INJECTION, SOLUTION EPIDURAL; INFILTRATION; INTRACAUDAL; PERINEURAL at 10:25

## 2017-07-10 RX ADMIN — FENTANYL CITRATE 25 MCG: 50 INJECTION, SOLUTION INTRAMUSCULAR; INTRAVENOUS at 11:35

## 2017-07-10 RX ADMIN — DOCUSATE SODIUM -SENNOSIDES 1 TABLET: 50; 8.6 TABLET, COATED ORAL at 18:13

## 2017-07-10 RX ADMIN — FENTANYL CITRATE 50 MCG: 50 INJECTION, SOLUTION INTRAMUSCULAR; INTRAVENOUS at 11:57

## 2017-07-10 RX ADMIN — BUPIVACAINE HYDROCHLORIDE 10 ML/HR: 7.5 INJECTION, SOLUTION EPIDURAL; RETROBULBAR at 12:14

## 2017-07-10 RX ADMIN — PROPOFOL 150 MG: 10 INJECTION, EMULSION INTRAVENOUS at 10:25

## 2017-07-10 RX ADMIN — FENTANYL CITRATE 25 MCG: 50 INJECTION, SOLUTION INTRAMUSCULAR; INTRAVENOUS at 10:59

## 2017-07-10 RX ADMIN — HYDROMORPHONE HYDROCHLORIDE 0.5 MG: 1 INJECTION, SOLUTION INTRAMUSCULAR; INTRAVENOUS; SUBCUTANEOUS at 12:13

## 2017-07-10 NOTE — INTERVAL H&P NOTE
H&P Update:  Sindy Pichardo was seen and examined. History and physical has been reviewed. The patient has been examined. There have been no significant clinical changes since the completion of the originally dated History and Physical.    Knee Arthroplasty   Sindy Pichardo presented with advanced degenerative joint disease of the knee with radiographic evidence of severe joint space narrowing. Previous non-operative treatments have been tried for more than 6 months, including rest, ice, activity modifications, bracing, pain medications, and injectable treatments. The pain and impairment have progressively worsened and now limit ADLS and have negatively impacted quality of life. The risks, benefits and potential complications of the procedure have been discussed with the patient and all questions have been answered satisfactorily. The specific risks discussed included, but are not limited to persistent pain, stiffness, bleeding requiring transfusion, blood clots, wound problems, infection, fracture, instability, wear, need for further surgery,  as well as heart attack, stroke and death.     Neva Sandifer, MD    Signed By: Neva Sandifer, MD     July 10, 2017 8:01 AM

## 2017-07-10 NOTE — ANESTHESIA PREPROCEDURE EVALUATION
Anesthetic History   No history of anesthetic complications            Review of Systems / Medical History  Patient summary reviewed and pertinent labs reviewed    Pulmonary  Within defined limits                 Neuro/Psych             Comments: RLS Cardiovascular                  Exercise tolerance: >4 METS     GI/Hepatic/Renal  Within defined limits              Endo/Other        Arthritis     Other Findings              Physical Exam    Airway  Mallampati: II  TM Distance: 4 - 6 cm  Neck ROM: normal range of motion   Mouth opening: Normal     Cardiovascular  Regular rate and rhythm,  S1 and S2 normal,  no murmur, click, rub, or gallop  Rhythm: regular  Rate: normal         Dental  No notable dental hx       Pulmonary  Breath sounds clear to auscultation               Abdominal  GI exam deferred       Other Findings            Anesthetic Plan    ASA: 2  Anesthesia type: general and regional - femoral continuous and popliteal fossa block      Post-op pain plan if not by surgeon: peripheral nerve block single and peripheral nerve block continuous      Anesthetic plan and risks discussed with: Patient

## 2017-07-10 NOTE — PROGRESS NOTES
7/10/2017 3:12 PM Case management consult for discharge planning received. Met with pt and pt's friend. Charted address and phone number confirmed. Pt will be discharging home to [address] Pt will have her friend and family at home to assist after discharge. Pt has an outpatient PT appt scheduled at [address] [place] [address] at 2:30PM on Thursday. Pt has rx coverage and fills her scripts at the [facility] in [place]. CM called pt's pharmacy and confirmed Xarelto 10mg is in stock. CM will follow for discharge needs. FARIDA LandisW   Care Management Interventions  PCP Verified by CM: Yes (Sarah Evangelista )  Mode of Transport at Discharge: Other (see comment) (Pt's friend )  Transition of Care Consult (CM Consult): Discharge Planning  MyChart Signup: No  Discharge Durable Medical Equipment: No (Pt owns a RW. )  Physical Therapy Consult: Yes  Occupational Therapy Consult: Yes  Speech Therapy Consult: No  Current Support Network:  Other  Confirm Follow Up Transport: Friends  Discharge Location  Discharge Placement: Home with outpatient services

## 2017-07-10 NOTE — BRIEF OP NOTE
BRIEF OPERATIVE NOTE    Date of Procedure: 7/10/2017   Preoperative Diagnosis: DJD  Postoperative Diagnosis: DJD    Procedure: Procedure(s):  RIGHT TOTAL KNEE REPLACEMENT (PRESS FIT) (GEN W/BLOCK)     Surgeon: Mary Skinner MD  Assistant(s): Berlin Ureña PA-C, ATC  Anesthesia: General   Estimated Blood Loss: minimal  Specimens: * No specimens in log *   Findings: oa  Complications: None  Implants:   Implant Name Type Inv.  Item Serial No.  Lot No. LRB No. Used Action   CEMENT BNE SIMPLEX W/GENT -- 10/PK - [id number]  CEMENT BNE SIMPLEX W/GENT -- 10/PK [id number] [employer] [facility] [id number] Right 1 Implanted   STEM TIB FIN ANTHONY [id number] --  - [id number]  STEM TIB FIN ANTHONY [id number] --  [id number] [employer] [id number] Right 1 Implanted   TRAY TIB ANTHONY POR REGENX 63MM --  - [id number]  TRAY TIB ANTHONY POR REGENX 63MM --  [id number] [employer] [id number] Right 1 Implanted   VANGUARD KNEE SYSTEM CR-FEMORAL RIGHT   [id number] [employer] [id number] Right 1 Implanted   PAT SER A STD 28X8 3 PEG -- NO WIRE - [id number]  PAT SER A STD 28X8 3 PEG -- NO WIRE [id number] [employer] [id number] Right 1 Implanted   INSERT TIB CR LIP 10X63/67MM -- VANGUARD - [id number]   INSERT TIB CR LIP 10X63/67MM -- VANGUARD [id number] [employer] [id number] Right 1 Implanted

## 2017-07-10 NOTE — IP AVS SNAPSHOT
Current Discharge Medication List  
  
CONTINUE these medications which have NOT CHANGED Dose & Instructions Dispensing Information Comments Morning Noon Evening Bedtime CRESTOR 5 mg tablet Generic drug:  rosuvastatin Your last dose was: Your next dose is:    
   
   
 Dose:  5 mg Take 5 mg by mouth daily. Refills:  0  
     
   
   
   
  
 OTHER Your last dose was: Your next dose is:    
   
   
 Indications: Pt states she takes calcium not sure of dose Refills:  0  
     
   
   
   
  
 VITAMIN D3 2,000 unit Tab Generic drug:  cholecalciferol (vitamin D3) Your last dose was: Your next dose is:    
   
   
 Dose:  2000 Units Take 2,000 Units by mouth daily. Refills:  0 STOP taking these medications   
 meloxicam 15 mg tablet Commonly known as:  MOBIC

## 2017-07-10 NOTE — ANESTHESIA PROCEDURE NOTES
Peripheral Block    Start time: 7/10/2017 9:08 AM  End time: 7/10/2017 9:18 AM  Performed by: Talita Graves  Authorized by: Talita Graves       Pre-procedure: Indications: at surgeon's request and post-op pain management    Preanesthetic Checklist: patient identified, risks and benefits discussed, site marked, timeout performed, anesthesia consent given and patient being monitored    Timeout Time: 09:08          Block Type:   Block Type:  Femoral continuous and popliteal  Laterality:  Right  Monitoring:  Continuous pulse ox, frequent vital sign checks, heart rate, responsive to questions and oxygen  Injection Technique:  Continuous  Procedures: ultrasound guided and nerve stimulator    Patient Position: supine  Prep: chlorhexidine    Location:  Upper thigh  Needle Type:  Tuohy  Needle Gauge:  18 G  Needle Localization:  Nerve stimulator and ultrasound guidance  Medication Injected:  0.5%  ropivacaine  Volume (mL):  30    Assessment:  Number of attempts:  1  Injection Assessment:  Incremental injection every 5 mL, local visualized surrounding nerve on ultrasound, negative aspiration for blood, no paresthesia and no intravascular symptoms  Patient tolerance:  Patient tolerated the procedure well with no immediate complications  Tibial nerve block performed with ultrasound, nerve stimulation and landmark identification. Needle utilized was 4\"stimuplex 21G. 20cc . 2% ropivacaine administered slowly with intermittent aspirations.

## 2017-07-10 NOTE — PROGRESS NOTES
Problem: Mobility Impaired (Adult and Pediatric)  Goal: *Acute Goals and Plan of Care (Insert Text)  Physical Therapy Goals  Initiated 7/10/2017    1. Patient will move from supine to sit and sit to supine , scoot up and down and roll side to side in bed with independence within 4 days. 2. Patient will perform sit to stand with modified independence within 4 days. 3. Patient will ambulate with modified independence for 150 feet with the least restrictive device within 4 days. 4. Patient will perform home exercise program per protocol with independence within 4 days. 5. Patient will demonstrate AROM 0-90 degrees in operative joint within 4 days. PHYSICAL THERAPY KNEE EVALUATION  Patient: Zoraida Love (51 y.o. female)  Date: 7/10/2017  Primary Diagnosis: DJD  OA (osteoarthritis) of knee  Procedure(s) (LRB):  RIGHT TOTAL KNEE REPLACEMENT (PRESS FIT) (GEN W/BLOCK)  (Right) Day of Surgery   Precautions: WBAT, fall, On Q         ASSESSMENT :  Based on the objective data described below, the patient presents with R LE weakness, decreased range of motion, dulled sensation, and decreased mobility after R TKA with femoral nerve On Q block. Pt reported light-headedness initially, but this settled soon. Pt ambulated about 16 feet with rolling walker and minimal assist x2 with verbal cues for R quad control and sequence initially. Pt sat on BSC during activity. Pt managed well. Pt was instructed in fall prevention and ankle pumps. Pt was hemodynamically stable with activity. Pt is well-motivated and should progress well. Pt states her other knee will probably need replacing in the near future. Patient will benefit from skilled intervention to address the above impairments.   Patients rehabilitation potential is considered to be Excellent  Factors which may influence rehabilitation potential include:   [ ]         None noted  [ ]         Mental ability/status  [ ]         Medical condition  [ ]         Home/family situation and support systems  [ ]         Safety awareness  [X]         Pain tolerance/management  [ ]         Other:        PLAN :  Recommendations and Planned Interventions:  [X]           Bed Mobility Training             [X]    Neuromuscular Re-Education  [X]           Transfer Training                   [ ]    Orthotic/Prosthetic Training  [X]           Gait Training                         [ ]    Modalities  [X]           Therapeutic Exercises           [ ]    Edema Management/Control  [X]           Therapeutic Activities            [X]    Patient and Family Training/Education  [ ]           Other (comment):     Frequency/Duration: Patient will be followed by physical therapy twice daily to address goals. Discharge Recommendations: Outpatient  Further Equipment Recommendations for Discharge: none       SUBJECTIVE:   Patient stated I am doing well.       OBJECTIVE DATA SUMMARY:   HISTORY:    Past Medical History:   Diagnosis Date    Arthritis       osteopenia    Chronic pain       knees/hips    High cholesterol      Ill-defined condition 06/21/2017     Over weight  BMI= 27.1    Insomnia      Restless leg syndrome      Urinary incontinence       stress /urge     Past Surgical History:   Procedure Laterality Date    HX GI         colonoscopy    HX GYN         vaginal sling    HX HEENT         Tonsilectomy as a child    HX ORTHOPAEDIC         local anesthesia- Trigger fingers bilat hands     Prior Level of Function/Home Situation: Pt was an independent community distance ambulator, was independent in ADL's, and has not fallen in the past year.   Personal factors and/or comorbidities impacting plan of care:      Home Situation  Home Environment: Private residence  # Steps to Enter: 0  One/Two Story Residence: One story  Living Alone: No  Support Systems: Friends \ neighbors, Family member(s)  Patient Expects to be Discharged to[de-identified] Private residence  Current DME Used/Available at Home: Cathleen Goldberg, rolling, Cane, straight  Tub or Shower Type: Shower     EXAMINATION/PRESENTATION/DECISION MAKING:   Critical Behavior:  Neurologic State: Alert  Orientation Level: Oriented X4  Cognition: Appropriate decision making, Appropriate for age attention/concentration, Appropriate safety awareness, Follows commands     Hearing: Auditory  Auditory Impairment: None  Skin:  Ace wrap in place  Range Of Motion:  AROM: Within functional limits (R LE limited after surgery, block)              RLE AROM  R Knee Flexion: 90  R Knee Extension: 25        Strength:    Strength: Within functional limits (R LE limited after surgery, block)                    Tone & Sensation:                  Sensation: Impaired (R LE dulled)               Coordination:     Vision:      Functional Mobility:  Bed Mobility:     Supine to Sit: Assist x1;Additional time; Moderate assistance  Sit to Supine: Assist x1;Additional time; Moderate assistance  Scooting: Supervision  Transfers:  Sit to Stand: Assist x2; Additional time; Moderate assistance  Stand to Sit: Assist x2; Additional time;Minimum assistance        Bed to Chair: Assist x2; Additional time; Moderate assistance;Minimum assistance              Balance:   Sitting: Intact  Standing: Impaired  Standing - Static: Fair  Standing - Dynamic : Fair  Ambulation/Gait Training:  Distance (ft): 16 Feet (ft)  Assistive Device: Gait belt;Walker, rolling  Ambulation - Level of Assistance: Assist x2;Minimal assistance     Gait Description (WDL): Exceptions to WDL  Gait Abnormalities: Decreased step clearance; Step to gait  Right Side Weight Bearing: As tolerated     Base of Support: Widened  Stance: Right decreased  Speed/Alyx: Pace decreased (<100 feet/min)  Step Length: Right shortened;Left shortened                                           Stairs:                           Therapeutic Exercises:   Instructed in ankle pumps and encouraged to exercise hourly     Functional Measure:  Barthel Index:      Bathin  Bladder: 5  Bowels: 10  Groomin  Dressin  Feeding: 10  Mobility: 0  Stairs: 0  Toilet Use: 5  Transfer (Bed to Chair and Back): 5  Total: 45         Barthel and G-code impairment scale:  Percentage of impairment CH  0% CI  1-19% CJ  20-39% CK  40-59% CL  60-79% CM  80-99% CN  100%   Barthel Score 0-100 100 99-80 79-60 59-40 20-39 1-19    0   Barthel Score 0-20 20 17-19 13-16 9-12 5-8 1-4 0      The Barthel ADL Index: Guidelines  1. The index should be used as a record of what a patient does, not as a record of what a patient could do. 2. The main aim is to establish degree of independence from any help, physical or verbal, however minor and for whatever reason. 3. The need for supervision renders the patient not independent. 4. A patient's performance should be established using the best available evidence. Asking the patient, friends/relatives and nurses are the usual sources, but direct observation and common sense are also important. However direct testing is not needed. 5. Usually the patient's performance over the preceding 24-48 hours is important, but occasionally longer periods will be relevant. 6. Middle categories imply that the patient supplies over 50 per cent of the effort. 7. Use of aids to be independent is allowed. Jennifer Delgado., Barthel, D.W. ([id number]). Functional evaluation: the Barthel Index. [facility] (14)2. Magalsi Wei, ДМИТРИЙ, Yulissa Saleh., Sweetie Vasquez., [facility], [address] [place] [address] (). Measuring the change indisability after inpatient rehabilitation; comparison of the responsiveness of the Barthel Index and Functional [place] Measure. Journal of Neurology, Neurosurgery, and Psychiatry, 66(4), 087-928. Naseem Min, N.J.A, Brandon Shipman  W.J.M, & Crissy Avila, M.A. (2004.) Assessment of post-stroke quality of life in cost-effectiveness studies: The usefulness of the Barthel Index and the EuroQoL-5D. Quality of Life Research, 13, 842-30         G codes:   In compliance with CMSs Claims Based Outcome Reporting, the following G-code set was chosen for this patient based on their primary functional limitation being treated: The outcome measure chosen to determine the severity of the functional limitation was the Barthel Index with a score of 45/100 which was correlated with the impairment scale. · Mobility - Walking and Moving Around:               - CURRENT STATUS:    CK - 40%-59% impaired, limited or restricted               - GOAL STATUS:           CJ - 20%-39% impaired, limited or restricted               - D/C STATUS:                       ---------------To be determined---------------         Physical Therapy Evaluation Charge Determination   History Examination Presentation Decision-Making   LOW Complexity : Zero comorbidities / personal factors that will impact the outcome / POC LOW Complexity : 1-2 Standardized tests and measures addressing body structure, function, activity limitation and / or participation in recreation  LOW Complexity : Stable, uncomplicated  Other outcome measures Barthel Index MEDIUM      Based on the above components, the patient evaluation is determined to be of the following complexity level: LOW      Pain:  Pain Scale 1: Numeric (0 - 10)  Pain Intensity 1: 6 (Pt. states she is able to rest and doesn't need pain med now)  Pain Location 1: Knee  Pain Orientation 1: Right; Anterior  Pain Description 1: Aching  Pain Intervention(s) 1: Ice;Position; Emotional support  Activity Tolerance:   fair  Please refer to the flowsheet for vital signs taken during this treatment.   After treatment:   [ ]         Patient left in no apparent distress sitting up in chair  [X]         Patient left in no apparent distress in bed  [X]         Call bell left within reach  [X]         Nursing notified  [X]         Caregiver present  [X]         Bed alarm activated      COMMUNICATION/EDUCATION:   The patients plan of care was discussed with: Registered Nurse.  [X]         Fall prevention education was provided and the patient/caregiver indicated understanding. [X]         Patient/family have participated as able in goal setting and plan of care. [X]         Patient/family agree to work toward stated goals and plan of care. [ ]         Patient understands intent and goals of therapy, but is neutral about his/her participation. [ ]         Patient is unable to participate in goal setting and plan of care.      Thank you for this referral.  Rubin Morillo, PT   Time Calculation: 27 mins

## 2017-07-10 NOTE — IP AVS SNAPSHOT
[address] 
[phone] Patient: Lauren Prado MRN: [id number] XVB:1[birth year] You are allergic to the following No active allergies Recent Documentation Height Weight Breastfeeding? BMI OB Status Smoking Status 1.575 m 65.5 kg No 26.41 kg/m2 Postmenopausal Never Smoker Emergency Contacts Name Discharge Info Relation Home Work [facility] [place]  Son [22]   [phone] [address]  Son [22]   [phone] About your hospitalization You were admitted on:  July 10, 2017 You last received care in the:  [facility] 4M POST SURG ORT 1 You were discharged on:  July 12, 2017 Unit phone number:  [phone] Why you were hospitalized Your primary diagnosis was:  Not on File Your diagnoses also included:  Oa (Osteoarthritis) Of Knee Providers Seen During Your Hospitalizations Provider Role Specialty Primary office phone Ailyn Delgado MD Attending Provider Orthopedic Surgery [phone] Your Primary Care Physician (PCP) Primary Care Physician Office Phone Office Fax Daisy Mejias [phone] Follow-up Information Follow up With Details Comments Contact Info Vijaya Diehl MD   [address] [place] [address] 
[phone] Current Discharge Medication List  
  
CONTINUE these medications which have NOT CHANGED Dose & Instructions Dispensing Information Comments Morning Noon Evening Bedtime CRESTOR 5 mg tablet Generic drug:  rosuvastatin Your last dose was: Your next dose is:    
   
   
 Dose:  5 mg Take 5 mg by mouth daily. Refills:  0  
     
   
   
   
  
 OTHER Your last dose was: Your next dose is:    
   
   
 Indications: Pt states she takes calcium not sure of dose Refills:  0  
     
   
   
   
  
 VITAMIN D3 2,000 unit Tab Generic drug:  cholecalciferol (vitamin D3) Your last dose was: Your next dose is:    
   
   
 Dose:  2000 Units Take 2,000 Units by mouth daily. Refills:  0 STOP taking these medications   
 meloxicam 15 mg tablet Commonly known as:  MOBIC Discharge Instructions Knee Surgery Discharge Instructions Dr. Ailyn Delgado Activity You should be as active as you can tolerate within the guidelines you have been given. Perform the exercises you have been given twice daily. Use your walker or crutches as directed by your physician. ? Ice: Application of ice will prevent and treat inflammation. You should use ice at least three times a day for 20 minutes. ? Dressing Changes & Showering:  Do not remove the clear, plastic dressing on your incision. This will be removed when you are seen at your follow-up appointment in Dr. Briana Harding' office. If the dressing is intact you may shower. Call the office immediately if there is any drainage from your dressing. ? Compression Stockings:  Wear your compression stockings everyday for 4 weeks. You may remove them at night to wash and let them air dry. ? You should rest for one hour twice a day with your feet elevated above your heart and your operative leg fully extended. Medications: 1. Pain:  You have been given a prescription for pain control. Take this medication as   directed. Do not take more than prescribed. If your pain is not controlled by the medication you were given, please call your surgeons office. Possible side effects of the medication include dizziness, headache, nausea, vomiting, constipation and urinary retention. If you experience any ofthese side effects call the office so that we can assist you in relieving them. Discontinue the use of the pain medication if you develop a rash, shortness of breath or difficulties swallowing.  If these symptoms become severe or aren't relieved by discontinuing the medication you should seek immediate medical attention. You should not drive or operate machinery while taking this medication. If you were prescribed Percocet (oxycodone/acetaminophen) or Norco/Lortab/Vicodin (hydrocodone/acetaminophen)  do not take Tylenol in addition to your pain medication, as these medications contain Tylenol. It is not safe for your liver to exceed 3000mg of Tylenol (acetaminophen) per day. If you were prescribed Roxicodone (oxycodone) or Dilaudid (hydromorphone) you should take Tylenol in addition. Do not exceed 3000mg of Tylenol per day. Refills of pain medication are authorized during office hours only ( Monday to Friday 8am-5pm) 2. Blood Thinner:  You have been given a prescription for Xarelto 10 mg. Please take one tablet each day for thirty days. Do not take anti-inflammatory medication (Advil, Aleve, Motrin) while taking the blood thinner. If the pharmacy needs a \"prior authorization\" for this medication call the office IMMEDIATELY. It is imperative that you take this medication every day. 3. Stool Softeners: You should take stool softeners while taking pain medication. Pain medications can cause constipation. Stool softeners, warm prune juice and increasing your water and fiber intake can help prevent constipation. Do not take laxatives. 4. You should resume the medications you were taking prior to your surgery. Pain medication may change the effects of any antidepressant medication you are taking. If you have any questions about possible interactions between your regular medications and the pain medication you should consult the physician who prescribes your regular medications. Diet  You may resume your regular home diet. Physical Therapy: You must begin outpatient physical therapy on Thursday or Friday.   Your were given a prescription for therapy when you scheduled your surgery. If you do not have an appointment scheduled or a therapy prescription please call Dr. Kendall Funes' office. APPOINTMENT:  OV [address] [facility] 7-13 at 2:30pm (Remenber:  Therapy has moved to the Medical Office Building at the back of the [facility] parking lot) Follow-Up Appointment: 
Please follow up at your scheduled follow up appointment. If you do not have an appointment, please call Dr. Magalie Heard office to schedule an appointment for 7-10 days after surgery. Your appointment will likely be with Kenrick Garcia PA-C. Maryjane Dorado assists Dr. Catherine Li in surgery. She will review your operation and answer any questions. APPOINTMENT:  7-24 at 1:20pm 
 
Reasons to call your surgeon: ? Fever above 101 for more than 12 hours which isn't relieved by an increase in fluid intake and taking Tylenol every 4-6 hours (this may be in pain medication) ? Persistent pain in the calf of either leg 
? Pain uncontrolled by taking your pain medication as prescribed ? A sudden increase in redness or swelling at the surgical site which is not relieved by rest, ice, and elevation ? Drainage from your incision ? Numbness, tingling, or change in your affected extremity ? Shortness of breath, chest pain, nausea, vomiting ? Any symptoms which are a concern to you Unless you are having a true medical emergency, you MUST call Dr. Kendall Funes' office BEFORE proceeding to the Emergency Department. A provider is on call 24 hours/day. Exercises after Knee Surgery 1. Knee Extension: 
                                                        
Instructions: 
? Sit on the edge of a chair and prop the heel of your surgical leg on a chair in front of you ? Place a 10 lb bag of rice on top of your knee and hold this position as long as possible ? You may also place your hands on top of your knee to help add more pressure ? You should perform this exercises as many times as possible throughout the day 
o At least 10 times each hour while awake Remember: - It is imperative to have full extension, 0 degrees, within the first 2 weeks after surgery 2. Knee Flexion: 
                                             
Instructions: 
? Place the ankle of your nonsurgical leg on top of the ankle of your surgical leg ? Use your good leg to help bend your surgical leg ? You should bend to at least 90 degrees ? You should perform this exercises as many times as possible throughout the day 
o At least 10 times each hour while awake Remember: - Your main goal is to obtain full range of motion as quickly as possible after surgery 
o During surgery, Dr. Asher Johnson will make sure that your new knee will bend and straighten completely. o We ask that you work hard to keep this motion - You should never sit with your knee in a half bent position until you are told otherwise by Dr. Asher Johnson. Do not sit in a recliner chair. Discharge Orders None [employer] Announcement We are excited to announce that we are making your provider's discharge notes available to you in Be[employer]. You will see these notes when they are completed and signed by the physician that discharged you from your recent hospital stay. If you have any questions or concerns about any information you see in Be[employer], please call the Health Information Department where you were seen or reach out to your Primary Care Provider for more information about your plan of care. Introducing [facility]! [facility] introduces Be[employer] patient portal. Now you can access parts of your medical record, email your doctor's office, and request medication refills online. 1. In your internet browser, go to [email or web address] 2. Click on the First Time User? Click Here link in the Sign In box. You will see the New Member Sign Up page. 3. Enter your [employer] Access Code exactly as it appears below. You will not need to use this code after youve completed the sign-up process. If you do not sign up before the expiration date, you must request a new code. · [employer] Access Code: [email or web address] Expires: 9/19/2017  8:06 AM 
 
4. Enter the last four digits of your Social Security Number (xxxx) and Date of Birth (mm/dd/yyyy) as indicated and click Submit. You will be taken to the next sign-up page. 5. Create a [employer] ID. This will be your [employer] login ID and cannot be changed, so think of one that is secure and easy to remember. 6. Create a [employer] password. You can change your password at any time. 7. Enter your Password Reset Question and Answer. This can be used at a later time if you forget your password. 8. Enter your e-mail address. You will receive e-mail notification when new information is available in [address] 9. Click Sign Up. You can now view and download portions of your medical record. 10. Click the Download Summary menu link to download a portable copy of your medical information. If you have questions, please visit the Frequently Asked Questions section of the [employer] website. Remember, [employer] is NOT to be used for urgent needs. For medical emergencies, dial 911. Now available from your iPhone and Android! General Information Please provide this summary of care documentation to your next provider. Patient Signature:  ____________________________________________________________ Date:  ____________________________________________________________  
  
[facility] Provider Signature:  ____________________________________________________________ Date:  ____________________________________________________________

## 2017-07-10 NOTE — OP NOTES
TOTAL KNEE ARTHROPLASTY OP NOTE      OPERATIVE REPORT    Patient: Rufino Miller CSN: [id number] [id number]: [id number]    YOB: 1945  Age: 71 y.o. Sex: female      Admit Date: 7/10/2017  Admit Diagnosis: DJD    Date of Procedure: 7/10/2017   Preoperative Diagnosis: DJD  Postoperative Diagnosis: DJD    Procedure: Procedure(s):  RIGHT TOTAL KNEE REPLACEMENT (PRESS FIT) (GEN W/BLOCK)   Surgeon: Aretha Virk MD  Assistant(s): Joel Cifuentes PA-C, ATc  Anesthesia: General   Estimated Blood Loss:<50CC  Specimens: * No specimens in log *   Complications: None  Implants:   Implant Name Type Inv. Item Serial No.  Lot No. LRB No. Used Action   CEMENT BNE SIMPLEX W/GENT -- 10/PK - -[id number]  CEMENT BNE SIMPLEX W/GENT -- 10/PK [id number] [employer] [facility] [id number] Right 1 Implanted   STEM TIB FIN ANTHONY [id number] --  - [id number]  STEM TIB FIN ANTHONY [id number] --  [id number] [employer] [id number] Right 1 Implanted   TRAY TIB ANTHONY POR REGENX 63MM --  - [id number]  TRAY TIB ANTHONY POR REGENX 63MM --  [id number] [employer] [id number] Right 1 Implanted   VANGUARD KNEE SYSTEM CR-FEMORAL RIGHT   [id number] [employer] [id number] Right 1 Implanted   PAT SER A STD 28X8 3 PEG -- NO WIRE - [id number]  PAT SER A STD 28X8 3 PEG -- NO WIRE [id number] [employer] [id number] Right 1 Implanted   INSERT TIB CR LIP 10X63/67MM -- VANGUARD - [id number]   INSERT TIB CR LIP 10X63/67MM -- VANG[employer]ARD [id number] [employer] [id number] Right 1 Implanted         INDICATIONS: The patient is a 71 y.o., female who has complained of a long history of knee pain. NAME@  has failed conservative treatment and presents for definitive operative care. DESCRIPTION OF PROCEDURE: After being informed of the risks and benefits, which include, but are not limited to, bleeding, infection, neurovascular damage, wound complications, pain and stiffness in the knee, periprosthetic loosening, fracture dislocation and DVT, the patient consented for the procedure. After adequate anesthesia, the right lower extremity was prepped and draped in sterile fashion. A standard midline incision was then made. If previous incisions near the midline were noted, they would have been followed in order to prevent any skin necrosis. The extensor retinaculum was then identified and a medial arthrotomy was performed. The patella was everted and measured. Based on the diameter of the patella, an appropriate thickness of patella was resected. Three lug holes for the patella were then drilled and the patella trial was then placed. The external tibial alignment guide was then brought proximally and pinned into position. The tibial cut was then performed with oscillating saw. The cut portion of the tibia was removed and the tibia was sized. The distal femur was then entered with the drill, and distal femoral alignment eugenia and cutting block were pinned into position. The distal femoral cut with the oscillating saw. The femur was then sized and the 4-in-1 cutting block and lug hole drill were used to finalize preparation of the femur. Trials were then placed and appropriate balancing of the knee in flexion and extension was performed. Care was taken to be sure that an appropriate distal femoral resection was performed to be sure that the patient easily obtained full extension. At that point, once the appropriate polyethylene trial was placed, the appropriate rotation of the tibia was then marked and the keel punch and pegs were used to finalize the preparation of the tibia. The final prosthesis was press fit into position on the femoral and tibial sides. The patella was cemented into positon and  the final tibial bearing was clipped into position on the clean and dry tibial baseplate. The knee was once again taken through the range of motion. We obtained full flexion, full extension, and equal ligamentous balancing throughout the range of motion.   The knee was then irrigated copiously using several liters of antibiotic irrigation and a L[place]y . The extensor retinaculum was closed meticulously, and the skin was closed in layers. Sterile dressings were applied. The patient was awakened and taken to the recovery room in stable condition.

## 2017-07-10 NOTE — ANESTHESIA POSTPROCEDURE EVALUATION
Post-Anesthesia Evaluation and Assessment    Patient: Ranjeet Kamara MRN: [id number]  SSN: [id number]    YOB: 1945  Age: 71 y.o. Sex: female       Cardiovascular Function/Vital Signs  Visit Vitals    /54    Pulse 68    Temp 36.9 °C (98.5 °F)    Resp 14    Ht 5' 2\" (1.575 m)    Wt 65.5 kg (144 lb 6.4 oz)    SpO2 98%    BMI 26.41 kg/m2       Patient is status post general, regional anesthesia for Procedure(s):  RIGHT TOTAL KNEE REPLACEMENT (PRESS FIT) (GEN W/BLOCK) . Nausea/Vomiting: None    Postoperative hydration reviewed and adequate. Pain:  Pain Scale 1: Numeric (0 - 10) (07/10/17 1252)  Pain Intensity 1: 6 (Pt. states she is able to rest and doesn't need pain med now) (07/10/17 1252)   Managed    Neurological Status:   Neuro (WDL): Within Defined Limits (07/10/17 1250)  Neuro  Neurologic State: Alert (07/10/17 5177)  Orientation Level: Oriented X4 (07/10/17 1154)  Cognition: Appropriate for age attention/concentration (07/10/17 1154)  LUE Motor Response: Purposeful (07/10/17 1154)  LLE Motor Response: Purposeful (07/10/17 1154)  RUE Motor Response: Purposeful (07/10/17 1154)  RLE Motor Response: Weak (surgical extremity) (07/10/17 1250)   At baseline    Mental Status and Level of Consciousness: Arousable    Pulmonary Status:   O2 Device: Nasal cannula (07/10/17 1250)   Adequate oxygenation and airway patent    Complications related to anesthesia: None    Post-anesthesia assessment completed.  No concerns    Signed By: Germain Gunter MD     July 10, 2017

## 2017-07-10 NOTE — PROGRESS NOTES
Occupational Therapy consult received and acknowledged. Chart reviewed. Patient presents s/p R TKR with On Q continuous nerve block in place. Evaluation is deferred to POD 2 at which time patient will have mobility to fully participate in functional task mobility and ADL evaluation. Thank you for the consult.   Lizeth Contreras, OTR/L, CBIS

## 2017-07-10 NOTE — PERIOP NOTES
TRANSFER - OUT REPORT:    Verbal report given to Gavin Davis RN on Susy Griffin  being transferred to [address] [place] [address] for routine post - op       Report consisted of patients Situation, Background, Assessment and   Recommendations(SBAR). Information from the following report(s) SBAR, OR Summary, Intake/Output, MAR, Recent Results and Cardiac Rhythm NSR was reviewed with the receiving nurse. Lines:   Peripheral IV 07/10/17 Right Forearm (Active)   Site Assessment Clean, dry, & intact 7/10/2017 12:50 PM   Phlebitis Assessment 0 7/10/2017 12:50 PM   Infiltration Assessment 0 7/10/2017 12:50 PM   Dressing Status Clean, dry, & intact 7/10/2017 12:50 PM   Dressing Type Transparent;Tape 7/10/2017 12:50 PM   Hub Color/Line Status Pink;Patent; Infusing 7/10/2017 12:50 PM   Alcohol Cap Used Yes 7/10/2017 12:50 PM        Opportunity for questions and clarification was provided.       Patient transported with:   O2 @ 2 liters  Registered Nurse

## 2017-07-10 NOTE — CONSULTS
.    [address] [facility] [address]   Office [phone]  Fax [phone]       Initial Consult Note     Name: Zoraida Love MRN: [id number]  Sex: female    YOB: 1945  Age: 71 y.o. PCP: Josephine Castellon MD     Date of admission:    7/10/2017  Date of consultation:   7/10/2017  Requesting physician:   Mary Skinner MD  Reason for consultation:   Medical Management    History of present illness  Zoraida Love is a 71 y.o. female with a h/o hyperlipemia, pre-diabetes, and OA of right knee who is s/p right total knee replacement . Family medicine service was consulted for management of chronic conditions. Pt has appropriate mild pain after surgery that improved with pain medication. Pt denies any other symptoms. Home Medications   Prior to Admission medications    Medication Sig Start Date End Date Taking? Authorizing Provider   rosuvastatin (CRESTOR) 5 mg tablet Take 5 mg by mouth daily. Yes Historical Provider   meloxicam (MOBIC) 15 mg tablet Take 15 mg by mouth daily. Indications: with food    Historical Provider   cholecalciferol, vitamin D3, (VITAMIN D3) 2,000 unit tab Take 2,000 Units by mouth daily.     Historical Provider   OTHER Indications: Pt states she takes calcium not sure of dose    Historical Provider       Allergies   No Known Allergies    Past Medical History   Past Medical History:   Diagnosis Date    Arthritis     osteopenia    Chronic pain     knees/hips    High cholesterol     Ill-defined condition 06/21/2017    Over weight  BMI= 27.1    Insomnia     Restless leg syndrome     Urinary incontinence     stress /urge       Past Surgical History  Past Surgical History:   Procedure Laterality Date    HX GI      colonoscopy    HX GYN      vaginal sling    HX HEENT      Tonsilectomy as a child    HX ORTHOPAEDIC      local anesthesia- Trigger fingers bilat hands       Family History  Family History   Problem Relation Age of Onset    Stroke Mother    Candice Karuna Arthritis-osteo Father     Prostate Cancer Father     Other Sister      spinal stenosis    Arthritis-osteo Sister     Prostate Cancer Brother     Arthritis-osteo Brother     Prostate Cancer Brother     Arthritis-osteo Brother        Social History   Living arrangements: patient lives alone in [place] but comes to [place] in the week to help take care of her quadriplegic son. Ambulates: Independently     Alcohol history: Never    Smoking history: non-smoker    Illicit drug history: no history of illicit drug use    Sexual history: unknown sexual history    Review of Systems  Review of Systems   Constitutional: Negative for chills and fever. HENT: Negative for hearing loss. Respiratory: Negative for chest tightness and shortness of breath. Cardiovascular: Negative for chest pain. Gastrointestinal: Negative for abdominal pain. Genitourinary:        Occasional urinary incontinence   Musculoskeletal: Negative for myalgias. Skin: Negative for rash. Neurological: Negative for dizziness. Psychiatric/Behavioral: Negative for agitation. Physical Exam  Objective:  General Appearance:  Comfortable and well-appearing. Vital signs: (most recent): Blood pressure 107/59, pulse 68, temperature 97.5 °F (36.4 °C), resp. rate 18, height 5' 2\" (1.575 m), weight 144 lb 6.4 oz (65.5 kg), SpO2 95 %, not currently breastfeeding. No fever. Output: Producing urine. HEENT: Normal HEENT exam.    Lungs:  Normal effort. Breath sounds clear to auscultation. No wheezes or rhonchi. Heart: Normal rate. Regular rhythm. S1 normal and S2 normal.  No murmur or gallop. Chest: Symmetric chest wall expansion. Extremities: There is no deformity or dependent edema. (Appropriate post-op right knee with bandages. C/D/I)  Neurological: Patient is alert and oriented to person, place and time. Skin:  Warm and dry. Abdomen: Abdomen is non-distended.   Bowel sounds are normal.     Pupils:  Pupils are equal, round, and reactive to light. Pulses: Distal pulses are intact. O2 Flow Rate (L/min): 2 l/min O2 Device: Room air     Laboratory Data  No results found for this or any previous visit (from the past 8 hour(s)). Imaging  Right knee X-ray showed AP and lateral views of the right knee demonstrate a total knee arthroplasty in near anatomic alignment and without evidence of orthopedic hardware complication. Impression / Recommendations     Rosilyn Gaucher is a 71 y.o. female who is  h/o hyperlipemia, pre-diabetes, and OA of right knee who is s/p right total knee replacement. The Family Medicine Service was consulted for medical management. Right OA POD0 s/p TKA- Pre-op Hgb was 12.8 (6/21/17). -Pain management, therapy, and disposition planning per orthopedics. Pre Diabetes Mellitus T2:  - Last HgA1c on 6/21/17 was 6.4.   - Not currently on medications. Discusses life style modifications  - POC glucose qhs, qc per Ortho  - Monitor POC and start SSI prn  - Diabetic diet once advanced by Ortho    Hyperlipidemia: No lipid panel on record. She received PCP care in [place].  - Continue home rosuvastatin   - F/U with outside PCP    FEN/GI - Per Primary team  Activity - Out of bed with assistance  DVT prophylaxis - Per primary team  GI prophylaxis - Not indicated at this time  Disposition - Plan to d/c to Per primary team.  Code Status - Full, discussed with patient / caregivers. Thank you very much for allowing us to participate in the care of this pleasant patient. The family medicine service will continue to follow the patient's medical progress along with you. Please do not hesitate to page with any questions or to discuss the case (pager # [phone]). Patient will be discussed with Dr. Poppy Blas by: Etienne Mccray MD  Family Medicine Resident        For Billing    No chief complaint on file.       Hospital Problems  Never Reviewed          Codes Class Noted POA    OA (osteoarthritis) of knee ICD-10-CM: M17.10  ICD-9-CM: 715.36  7/10/2017 Unknown

## 2017-07-10 NOTE — H&P (VIEW-ONLY)
PAT Pre-Op History & Physical    Patient: Bhavin Keys                  MRN: [id number]          SSN: [id number]  YOB: 1945          Age: 71 y.o. Sex: female                Subjective:   Patient is a 71 y.o.  female who presents with history of chronic right knee pain that has been a problem for \"years\" but has escalated over the last 1-2 months. Rates her knee pain 5/10 and characterizes it as a constant ache, especially at night. States that prolonged walking exacerbates her knee pain. Has failed steroid injections, gel injections, NSAIDs, Tylenol, and application of ice. The patient was evaluated in the surgeon's office and it was determined that the most appropriate plan of care is to proceed with surgical intervention. Patient's PCP Helga Tavarez MD      Past Medical History:   Diagnosis Date    Arthritis     osteopenia    Chronic pain     knees/hips    High cholesterol     Ill-defined condition 06/21/2017    Over weight  BMI= 27.1    Insomnia     Restless leg syndrome     Urinary incontinence     stress /urge      Past Surgical History:   Procedure Laterality Date    HX GI      colonoscopy    HX GYN      vaginal sling    HX HEENT      Tonsilectomy as a child    HX ORTHOPAEDIC      local anesthesia- Trigger fingers bilat hands      Prior to Admission medications    Medication Sig Start Date End Date Taking? Authorizing Provider   meloxicam (MOBIC) 15 mg tablet Take 15 mg by mouth daily. Indications: with food   Yes Historical Provider   cholecalciferol, vitamin D3, (VITAMIN D3) 2,000 unit tab Take 2,000 Units by mouth daily. Yes Historical Provider   OTHER Indications: Pt states she takes calcium not sure of dose   Yes Historical Provider   rosuvastatin (CRESTOR) 5 mg tablet Take 5 mg by mouth daily. Yes Historical Provider     Current Outpatient Prescriptions   Medication Sig    meloxicam (MOBIC) 15 mg tablet Take 15 mg by mouth daily.  Indications: with food    cholecalciferol, vitamin D3, (VITAMIN D3) 2,000 unit tab Take 2,000 Units by mouth daily.  OTHER Indications: Pt states she takes calcium not sure of dose    rosuvastatin (CRESTOR) 5 mg tablet Take 5 mg by mouth daily. No current facility-administered medications for this encounter. No Known Allergies   Social History   Substance Use Topics    Smoking status: Never Smoker    Smokeless tobacco: Never Used    Alcohol use No      History   Drug Use No     Family History   Problem Relation Age of Onset    Stroke Mother     Arthritis-osteo Father     Prostate Cancer Father     Other Sister      spinal stenosis    Arthritis-osteo Sister     Prostate Cancer Brother     Arthritis-osteo Brother     Prostate Cancer Brother     Arthritis-osteo Brother          Review of Systems    Patient denies difficulty swallowing, mouth sores, or loose teeth. Patient denies any recent dental procedures or any planned prior to surgery. Patient denies chest pain, tightness, pain radiating down left arm, palpitations. Denies dizziness, visual disturbances, or lightheadedness. Patient denies shortness of breath, wheezing, cough, fever, or chills. Patient denies diarrhea, constipation, or abdominal pain. Patient denies urinary problems including dysuria, hesitancy, urgency, or incontinence. Denies skin breakdown, rashes, insect bites or open area. C/o right knee pain. Objective:   Patient Vitals for the past 24 hrs:   Temp Pulse Resp BP SpO2   06/21/17 0940 98.4 °F (36.9 °C) 90 18 157/76 97 %       Body mass index is 27.12 kg/(m^2). Wt Readings from Last 1 Encounters:   06/21/17 67.2 kg (148 lb 4 oz)        Physical Exam:     General: Pleasant,  cooperative, no apparent distress, appears stated age. Eyes: Conjunctivae/corneas clear. EOMs intact. Nose: Nares normal.   Mouth/Throat: Lips, mucosa, and tongue normal. Teeth and gums normal.   Neck: Supple, symmetrical, trachea midline.     Back: Symmetric   Lungs: Clear to auscultation bilaterally. Heart: Regular rate and rhythm, S1, S2 normal. No murmur, click, rub or gallop. Abdomen: Soft, non-tender. Bowel sounds normal. No distention. Musculoskeletal:  Gait is antalgic. + crepitus right knee. Extremities:  Extremities normal, atraumatic, no cyanosis or edema. Calves                                 supple, non tender to palpation. Pulses: 2+ and symmetric bilateral upper extremities. Cap. refill <2 seconds   Skin: Skin color, texture, turgor normal.  No rashes or lesions. Neurologic: CN II-XII grossly intact. Alert and oriented x3. Labs:   Recent Results (from the past 72 hour(s))   EKG, 12 LEAD, INITIAL    Collection Time: 06/21/17 12:17 PM   Result Value Ref Range    Ventricular Rate 57 BPM    Atrial Rate 57 BPM    P-R Interval 188 ms    QRS Duration 76 ms    Q-T Interval 430 ms    QTC Calculation (Bezet) 418 ms    Calculated P Axis 42 degrees    Calculated R Axis 2 degrees    Calculated T Axis 43 degrees    Diagnosis       Sinus bradycardia  Otherwise normal ECG  No previous ECGs available  Confirmed by Juanito Robles MD ([id number]) on 6/21/2017 3:14:00 PM     CBC WITH AUTOMATED DIFF    Collection Time: 06/21/17 12:57 PM   Result Value Ref Range    WBC 7.8 3.6 - 11.0 K/uL    RBC 4.53 3.80 - 5.20 M/uL    HGB 12.8 11.5 - 16.0 g/dL    HCT 39.2 35.0 - 47.0 %    MCV 86.5 80.0 - 99.0 FL    MCH 28.3 26.0 - 34.0 PG    MCHC 32.7 30.0 - 36.5 g/dL    RDW 13.5 11.5 - 14.5 %    PLATELET 242 755 - 006 K/uL    NEUTROPHILS 56 32 - 75 %    LYMPHOCYTES 29 12 - 49 %    MONOCYTES 11 5 - 13 %    EOSINOPHILS 3 0 - 7 %    BASOPHILS 1 0 - 1 %    ABS. NEUTROPHILS 4.4 1.8 - 8.0 K/UL    ABS. LYMPHOCYTES 2.3 0.8 - 3.5 K/UL    ABS. MONOCYTES 0.8 0.0 - 1.0 K/UL    ABS. EOSINOPHILS 0.2 0.0 - 0.4 K/UL    ABS.  BASOPHILS 0.1 0.0 - 0.1 K/UL   METABOLIC PANEL, COMPREHENSIVE    Collection Time: 06/21/17 12:57 PM   Result Value Ref Range    Sodium 145 136 - 145 mmol/L Potassium 4.4 3.5 - 5.1 mmol/L    Chloride 106 97 - 108 mmol/L    CO2 30 21 - 32 mmol/L    Anion gap 9 5 - 15 mmol/L    Glucose 91 65 - 100 mg/dL    BUN 15 6 - 20 MG/DL    Creatinine 0.95 0.55 - 1.02 MG/DL    BUN/Creatinine ratio 16 12 - 20      GFR est AA >60 >60 ml/min/1.73m2    GFR est non-AA 58 (L) >60 ml/min/1.73m2    Calcium 9.5 8.5 - 10.1 MG/DL    Bilirubin, total 0.7 0.2 - 1.0 MG/DL    ALT (SGPT) 31 12 - 78 U/L    AST (SGOT) 23 15 - 37 U/L    Alk.  phosphatase 35 (L) 45 - 117 U/L    Protein, total 6.8 6.4 - 8.2 g/dL    Albumin 4.0 3.5 - 5.0 g/dL    Globulin 2.8 2.0 - 4.0 g/dL    A-G Ratio 1.4 1.1 - 2.2     HEMOGLOBIN A1C WITH EAG    Collection Time: 06/21/17 12:57 PM   Result Value Ref Range    Hemoglobin A1c 6.4 (H) 4.2 - 6.3 %    Est. average glucose 137 mg/dL   CULTURE, MRSA    Collection Time: 06/21/17 12:57 PM   Result Value Ref Range    Special Requests: NO SPECIAL REQUESTS      Culture result: MRSA NOT PRESENT  AT 13 HOURS       PROTHROMBIN TIME + INR    Collection Time: 06/21/17 12:57 PM   Result Value Ref Range    INR 1.0 0.9 - 1.1      Prothrombin time 10.3 9.0 - 11.1 sec   PTT    Collection Time: 06/21/17 12:57 PM   Result Value Ref Range    aPTT 27.0 22.1 - 32.5 sec    aPTT, therapeutic range     58.0 - 77.0 SECS   URINALYSIS W/ REFLEX CULTURE    Collection Time: 06/21/17 12:57 PM   Result Value Ref Range    Color YELLOW/STRAW      Appearance CLEAR CLEAR      Specific gravity 1.021 1.003 - 1.030      pH (UA) 5.5 5.0 - 8.0      Protein NEGATIVE  NEG mg/dL    Glucose NEGATIVE  NEG mg/dL    Ketone 15 (A) NEG mg/dL    Bilirubin NEGATIVE  NEG      Blood NEGATIVE  NEG      Urobilinogen 0.2 0.2 - 1.0 EU/dL    Nitrites NEGATIVE  NEG      Leukocyte Esterase NEGATIVE  NEG      WBC 0-4 0 - 4 /hpf    RBC 0-5 0 - 5 /hpf    Epithelial cells FEW FEW /lpf    Bacteria NEGATIVE  NEG /hpf    UA:UC IF INDICATED CULTURE NOT INDICATED BY UA RESULT CNI         Assessment:     DJD    Plan:     Scheduled for Right press fit total knee arthroplasty. Labs and EKG done per surgeon's orders. LAb results and EKG reviewed- unremarkable. MRSA pending. Attended joint class 6/21/2017.     Marielena Green NP

## 2017-07-11 LAB
ANION GAP BLD CALC-SCNC: 6 MMOL/L (ref 5–15)
BUN SERPL-MCNC: 14 MG/DL (ref 6–20)
BUN/CREAT SERPL: 17 (ref 12–20)
CALCIUM SERPL-MCNC: 8.2 MG/DL (ref 8.5–10.1)
CHLORIDE SERPL-SCNC: 107 MMOL/L (ref 97–108)
CO2 SERPL-SCNC: 26 MMOL/L (ref 21–32)
CREAT SERPL-MCNC: 0.84 MG/DL (ref 0.55–1.02)
GLUCOSE SERPL-MCNC: 124 MG/DL (ref 65–100)
HGB BLD-MCNC: 11.3 G/DL (ref 11.5–16)
POTASSIUM SERPL-SCNC: 4.2 MMOL/L (ref 3.5–5.1)
SODIUM SERPL-SCNC: 139 MMOL/L (ref 136–145)

## 2017-07-11 PROCEDURE — 36415 COLL VENOUS BLD VENIPUNCTURE: CPT | Performed by: ORTHOPAEDIC SURGERY

## 2017-07-11 PROCEDURE — 74011250637 HC RX REV CODE- 250/637: Performed by: ANESTHESIOLOGY

## 2017-07-11 PROCEDURE — 74011250636 HC RX REV CODE- 250/636: Performed by: ORTHOPAEDIC SURGERY

## 2017-07-11 PROCEDURE — 74011250637 HC RX REV CODE- 250/637: Performed by: STUDENT IN AN ORGANIZED HEALTH CARE EDUCATION/TRAINING PROGRAM

## 2017-07-11 PROCEDURE — 97116 GAIT TRAINING THERAPY: CPT

## 2017-07-11 PROCEDURE — 97110 THERAPEUTIC EXERCISES: CPT

## 2017-07-11 PROCEDURE — 74011250637 HC RX REV CODE- 250/637: Performed by: ORTHOPAEDIC SURGERY

## 2017-07-11 PROCEDURE — 85018 HEMOGLOBIN: CPT | Performed by: ORTHOPAEDIC SURGERY

## 2017-07-11 PROCEDURE — 65270000029 HC RM PRIVATE

## 2017-07-11 PROCEDURE — 80048 BASIC METABOLIC PNL TOTAL CA: CPT | Performed by: ORTHOPAEDIC SURGERY

## 2017-07-11 RX ADMIN — CEFAZOLIN 2 G: 1 INJECTION, POWDER, FOR SOLUTION INTRAMUSCULAR; INTRAVENOUS; PARENTERAL at 00:00

## 2017-07-11 RX ADMIN — Medication 10 ML: at 21:50

## 2017-07-11 RX ADMIN — OXYCODONE HYDROCHLORIDE 10 MG: 10 TABLET, FILM COATED, EXTENDED RELEASE ORAL at 20:23

## 2017-07-11 RX ADMIN — CEFAZOLIN 2 G: 1 INJECTION, POWDER, FOR SOLUTION INTRAMUSCULAR; INTRAVENOUS; PARENTERAL at 08:41

## 2017-07-11 RX ADMIN — ROSUVASTATIN CALCIUM 5 MG: 10 TABLET, FILM COATED ORAL at 08:42

## 2017-07-11 RX ADMIN — FAMOTIDINE 20 MG: 20 TABLET, FILM COATED ORAL at 08:42

## 2017-07-11 RX ADMIN — ACETAMINOPHEN 650 MG: 325 TABLET ORAL at 11:53

## 2017-07-11 RX ADMIN — OXYCODONE HYDROCHLORIDE 10 MG: 5 TABLET ORAL at 16:41

## 2017-07-11 RX ADMIN — Medication 10 ML: at 16:42

## 2017-07-11 RX ADMIN — DOCUSATE SODIUM -SENNOSIDES 1 TABLET: 50; 8.6 TABLET, COATED ORAL at 17:57

## 2017-07-11 RX ADMIN — ACETAMINOPHEN 650 MG: 325 TABLET ORAL at 23:17

## 2017-07-11 RX ADMIN — CELECOXIB 200 MG: 100 CAPSULE ORAL at 20:23

## 2017-07-11 RX ADMIN — CELECOXIB 200 MG: 100 CAPSULE ORAL at 08:42

## 2017-07-11 RX ADMIN — DOCUSATE SODIUM -SENNOSIDES 1 TABLET: 50; 8.6 TABLET, COATED ORAL at 08:43

## 2017-07-11 RX ADMIN — OXYCODONE HYDROCHLORIDE 10 MG: 10 TABLET, FILM COATED, EXTENDED RELEASE ORAL at 08:40

## 2017-07-11 RX ADMIN — ACETAMINOPHEN 650 MG: 325 TABLET ORAL at 17:58

## 2017-07-11 RX ADMIN — ACETAMINOPHEN 650 MG: 325 TABLET ORAL at 05:27

## 2017-07-11 RX ADMIN — RIVAROXABAN 10 MG: 10 TABLET, FILM COATED ORAL at 17:57

## 2017-07-11 RX ADMIN — OXYCODONE HYDROCHLORIDE 5 MG: 5 TABLET ORAL at 23:17

## 2017-07-11 RX ADMIN — POLYETHYLENE GLYCOL 3350 17 G: 17 POWDER, FOR SOLUTION ORAL at 08:41

## 2017-07-11 NOTE — PROGRESS NOTES
Problem: Knee Replacement: Post-Op Day 1  Goal: *Demonstrates progressive activity  Outcome: Progressing Towards Goal  OOB, participating with Rehab. Working on flexion and extension. Goal: *Optimal pain control at patients stated goal  Tolerating prn analgesia, stated pain goal =3.  Goal: *Discharge plan identified  Plan for discharge to home with [facility] when medically stable and clears Rehab. Plan for Wednesday.

## 2017-07-11 NOTE — PROGRESS NOTES
Problem: Mobility Impaired (Adult and Pediatric)  Goal: *Acute Goals and Plan of Care (Insert Text)  Physical Therapy Goals  Initiated 7/10/2017    1. Patient will move from supine to sit and sit to supine , scoot up and down and roll side to side in bed with independence within 4 days. 2. Patient will perform sit to stand with modified independence within 4 days. 3. Patient will ambulate with modified independence for 150 feet with the least restrictive device within 4 days. 4. Patient will perform home exercise program per protocol with independence within 4 days. 5. Patient will demonstrate AROM 0-90 degrees in operative joint within 4 days. PHYSICAL THERAPY TREATMENT  Patient: Bhavin Keys (11 y.o. female)  Date: 7/11/2017  Diagnosis: DJD  OA (osteoarthritis) of knee <principal problem not specified>  Procedure(s) (LRB):  RIGHT TOTAL KNEE REPLACEMENT (PRESS FIT) (GEN W/BLOCK)  (Right) 1 Day Post-Op  Precautions: Fall (On Q)      ASSESSMENT:  Patient seen for afternoon treatment. She reports increase in pain this afternoon compared with am. Advised patient she needs to request pain medication before pain becomes unbearable. Pain 8/10 and called RN on patient's behalf. Patient able to tolerate step through gait without knee instability and all exercises reviewed. Large elevation wedge unavailable so created bolster for knee extension stretch. Patient very motivated and diligent with exercises. Patient is ahead of the  curve. Should progress well with possible steps and discharge after am tx. Progression toward goals:  [ ]      Improving appropriately and progressing toward goals  [X]      Improving slowly and progressing toward goals  [ ]      Not making progress toward goals and plan of care will be adjusted       PLAN:  Patient continues to benefit from skilled intervention to address the above impairments. Continue treatment per established plan of care.   Discharge Recommendations: Outpatient  Further Equipment Recommendations for Discharge:  none       SUBJECTIVE:   I just want to make sure I am doing everything correctly. OBJECTIVE DATA SUMMARY:   Range of Motion:           RLE AROM  R Knee Flexion: 96  R Knee Extension: 20              Functional Mobility Training:  Bed Mobility:  Rolling:  (in chair on arrival)     Transfers:  Sit to Stand: Contact guard assistance  Stand to Sit: Contact guard assistance  Stand Pivot Transfers: Contact guard assistance     Bed to Chair: Contact guard assistance        Ambulation/Gait Training:  Distance (ft): 150 Feet (ft)  Assistive Device: Gait belt;Walker, rolling  Ambulation - Level of Assistance: Contact guard assistance; Additional time;Assist x2  Gait Abnormalities: Antalgic (step through fluid gait pattern)  Base of Support: Narrowed  Speed/Alyx: Accelerated     Therapeutic Exercises:   Exercises performed per protocol. Patient with knee extension lag for SLRs. Patient able to complete SAQs  Pain:  Pain Scale 1: Numeric (0 - 10)  Pain Intensity 1: 8  Pain Location 1: Knee  Pain Orientation 1: Right  Pain Description 1: Aching  Pain Intervention(s) 1: Medication (see MAR)      Activity Tolerance:   good  Please refer to the flowsheet for vital signs taken during this treatment.   After treatment:   [ ] Patient left in no apparent distress sitting up in chair  [X] Patient left in no apparent distress in bed  [X] Call bell left within reach  [X] Nursing notified  [ ] Caregiver present  [ ] Bed alarm activated      COMMUNICATION/COLLABORATION:   The patients plan of care was discussed with: Registered Nurse     Brie Banuelos PT   Time Calculation: 30 mins

## 2017-07-11 NOTE — PROGRESS NOTES
[facility] FAMILY MEDICINE RESIDENCY PROGRAM                                                        PROGRESS NOTE     Date: 7/11/2017  PCP: Raquel Wang MD    Assessment/Plan:   Ivan Loo is a 71 y.o. female who is  h/o hyperlipemia, pre-diabetes, and OA of right knee who is s/p right total knee replacement. The Family Medicine Service was consulted for medical management.     Right OA POD#1 s/p Right TKA - Patient doing well post-op. Pain well controlled. Tolerating regular diet.  -Pain management, therapy, and disposition planning per orthopedics. Borderline low BP: BP 94/60 this am. Pt is asymptomatic.  - Check orthostatic BP, HR  - Consider IVF bolus if remains low     Pre-Diabetes Mellitus: Glucose 124 this am. Last HgA1c on 6/21/17 was 6.4%. - Cancelled POC glucose  - Daily BMP  - Diabetic diet     Hyperlipidemia: No lipid panel on record. - Continue home rosuvastatin 5mg daily  - F/U with PCP     FEN/GI - Per Primary team  Activity - Out of bed with assistance  DVT prophylaxis - Per primary team  GI prophylaxis - Not indicated at this time  Disposition - Plan to d/c to Per primary team.  Code Status - Full, discussed with patient / caregivers. Subjective:   CC: None    Ivan Loo was seen and examined at bedside. Pt has been ambulating to the bathroom, eating regular diet. She denies complaints. Says pain is well controlled.      Patient denies dizziness, lightheadedness, fever, chills, CP, SOB, abd pain, nausea, vomiting    Objective:   Physical examination  Visit Vitals    /69 (BP 1 Location: Left arm, BP Patient Position: Standing)    Pulse 67    Temp 98.8 °F (37.1 °C)    Resp 18    Ht 5' 2\" (1.575 m)    Wt 144 lb 6.4 oz (65.5 kg)    SpO2 95%    Breastfeeding No    BMI 26.41 kg/m2     O2 Delivery: RA       Intake/Output Summary (Last 24 hours) at 07/11/17 1243  Last data filed at 07/11/17 0531   Gross per 24 hour   Intake          1806.67 ml   Output             2425 ml   Net          -618.33 ml       Gen: alert, cooperative, no distress, pleasant  ENT: Moist mucus membranes  Heart: regular heart rate, no murmurs, no JVD  Lungs: Normal chest wall and respirations. Clear to auscultation.   Extrem: Knee bent at 90 degrees in chair, wound dressing on R knee with mild swelling beneath, no ecchymosis or erythema    Data Review:   Labs Reviewed:   Recent Results (from the past 24 hour(s))   METABOLIC PANEL, BASIC    Collection Time: 07/11/17  3:12 AM   Result Value Ref Range    Sodium 139 136 - 145 mmol/L    Potassium 4.2 3.5 - 5.1 mmol/L    Chloride 107 97 - 108 mmol/L    CO2 26 21 - 32 mmol/L    Anion gap 6 5 - 15 mmol/L    Glucose 124 (H) 65 - 100 mg/dL    BUN 14 6 - 20 MG/DL    Creatinine 0.84 0.55 - 1.02 MG/DL    BUN/Creatinine ratio 17 12 - 20      GFR est AA >60 >60 ml/min/1.73m2    GFR est non-AA >60 >60 ml/min/1.73m2    Calcium 8.2 (L) 8.5 - 10.1 MG/DL   HEMOGLOBIN    Collection Time: 07/11/17  3:12 AM   Result Value Ref Range    HGB 11.3 (L) 11.5 - 16.0 g/dL       Medications Reviewed:   Current Facility-Administered Medications   Medication Dose Route Frequency    rivaroxaban (XARELTO) tablet 10 mg  10 mg Oral Q24H    traMADol (ULTRAM) tablet 50 mg  50 mg Oral Q4H PRN    sodium chloride (NS) flush 5-10 mL  5-10 mL IntraVENous Q8H    sodium chloride (NS) flush 5-10 mL  5-10 mL IntraVENous PRN    oxyCODONE IR (ROXICODONE) tablet 5 mg  5 mg Oral Q3H PRN    oxyCODONE IR (ROXICODONE) tablet 10 mg  10 mg Oral Q3H PRN    HYDROmorphone (PF) (DILAUDID) injection 0.5 mg  0.5 mg IntraVENous Q4H PRN    naloxone (NARCAN) injection 0.4 mg  0.4 mg IntraVENous PRN    ondansetron (ZOFRAN) injection 4 mg  4 mg IntraVENous Q4H PRN    hydrOXYzine HCl (ATARAX) tablet 10 mg  10 mg Oral Q8H PRN    famotidine (PEPCID) tablet 20 mg  20 mg Oral DAILY    senna-docusate (PERICOLACE) 8.6-50 mg per tablet 1 Tab  1 Tab Oral BID    polyethylene glycol (MIRALAX) packet 17 g  17 g Oral DAILY    bisacodyl (DULCOLAX) suppository 10 mg  10 mg Rectal DAILY PRN    bupivacaine (PF) 0.125 % (ON-Q BAG)  400 ml  10 mL/hr Local Infiltration CONTINUOUS    oxyCODONE ER (OxyCONTIN) tablet 10 mg  10 mg Oral Q12H    celecoxib (CELEBREX) capsule 200 mg  200 mg Oral Q12H    acetaminophen (TYLENOL) tablet 650 mg  650 mg Oral Q6H    rosuvastatin (CRESTOR) tablet 5 mg  5 mg Oral DAILY       Signed:   Tiesha Han   Resident, Family Medicine    Attending note:    [facility] attending note to follow

## 2017-07-11 NOTE — PROGRESS NOTES
Ortho Progress Note     Patient: Jose Osorio MRN: [id number]  SSN: [id number]    YOB: 1945  Age: 71 y.o. Sex: female      POD:    1 Day Post-Op  S/P:    Procedure(s):  RIGHT TOTAL KNEE REPLACEMENT (PRESS FIT) (GEN W/BLOCK)      Subjective:   Jose Osorio is a 71 y.o. female who is resting in bed with an appropriate level of post-operative knee pain. She is 1 Day Post-Op s/p Procedure(s):  RIGHT TOTAL KNEE REPLACEMENT (PRESS FIT) (GEN W/BLOCK)      Objective  Objective:   Patient Vitals for the past 12 hrs:   BP Temp Pulse Resp SpO2   07/11/17 0310 95/56 97.8 °F (36.6 °C) 65 18 96 %   07/11/17 0020 97/60 97.9 °F (36.6 °C) 60 14 94 %   07/10/17 2020 99/61 98.3 °F (36.8 °C) 67 17 95 %     Recent Labs      07/11/17   0312   HGB  11.3*   NA  139   K  4.2   CL  107   CO2  26   BUN  14   CREA  0.84   GLU  124*       Alert and oriented x3. The operative knee dressing is clean/dry/intact. Bilateral calves are soft and nontender with a negative Homans sign. Dorsiflexion and plantar flexion are intact. The peripheral nerve block is intact without drainage. The patient is neurovascularly intact.     Gait  Base of Support: Widened  Speed/Alyx: Pace decreased (<100 feet/min)  Step Length: Right shortened, Left shortened  Stance: Right decreased  Gait Abnormalities: Decreased step clearance, Step to gait  Ambulation - Level of Assistance: Assist x2, Minimal assistance  Distance (ft): 16 Feet (ft)  Assistive Device: Gait belt, Walker, rolling       Patient mobility  Bed Mobility Training  Supine to Sit: Assist x1, Additional time, Moderate assistance  Sit to Supine: Assist x1, Additional time, Moderate assistance  Scooting: Supervision  Transfer Training  Sit to Stand: Assist x2, Additional time, Moderate assistance  Stand to Sit: Assist x2, Additional time, Minimum assistance  Bed to Chair: Assist x2, Additional time, Moderate assistance, Minimum assistance      Gait Training  Assistive Device: Gait belt, Walker, rolling  Ambulation - Level of Assistance: Assist x2, Minimal assistance  Distance (ft): 16 Feet (ft)   Weight Bearing Status  Right Side Weight Bearing: As tolerated         Assessment:     Assessment:     Patient Active Problem List   Diagnosis Code    OA (osteoarthritis) of knee M17.10    s/p Procedure(s):  RIGHT TOTAL KNEE REPLACEMENT (PRESS FIT) (GEN W/BLOCK)        Plan:   1. We will have the patient work with formalized physical therapy and occupational therapy to improve ambulation and range of motion. 2. The patient will continue the current pain management regiment which includes the peripheral nerve block and oral narcotics. The PNB will be discontinued tomorrow am at 0600.  3. We will continue DVT prophalaxis.   David Ortiz MD

## 2017-07-11 NOTE — PROGRESS NOTES
Problem: Mobility Impaired (Adult and Pediatric)  Goal: *Acute Goals and Plan of Care (Insert Text)  Physical Therapy Goals  Initiated 7/10/2017    1. Patient will move from supine to sit and sit to supine , scoot up and down and roll side to side in bed with independence within 4 days. 2. Patient will perform sit to stand with modified independence within 4 days. 3. Patient will ambulate with modified independence for 150 feet with the least restrictive device within 4 days. 4. Patient will perform home exercise program per protocol with independence within 4 days. 5. Patient will demonstrate AROM 0-90 degrees in operative joint within 4 days. PHYSICAL THERAPY TREATMENT  Patient: Tommy Drake (43 y.o. female)  Date: 7/11/2017  Diagnosis: DJD  OA (osteoarthritis) of knee <principal problem not specified>  Procedure(s) (LRB):  RIGHT TOTAL KNEE REPLACEMENT (PRESS FIT) (GEN W/BLOCK)  (Right) 1 Day Post-Op  Precautions: Fall (On Q)      ASSESSMENT:  Patient agreeable to treatment. Patient moving very well considering only POD#1 and On Q femoral nerve block in place. Patient with step through gait pattern for 150' without sign of right knee instability. Fluid gait pattern and patient reports pain nearly 0/10 today. Reviewed knee exercises and positioning and patient demonstrated and verbalized good understanding. Reminded up only with assistance x 2 at this time due to On Q precautions for fall prevention- patient agreed to follow. Patient requested back up to chair for lunch then will call RN for BTB  Progression toward goals:  [X]      Improving appropriately and progressing toward goals  [ ]      Improving slowly and progressing toward goals  [ ]      Not making progress toward goals and plan of care will be adjusted       PLAN:  Patient continues to benefit from skilled intervention to address the above impairments. Continue treatment per established plan of care.   Discharge Recommendations: Outpatient  Further Equipment Recommendations for Discharge:  none       SUBJECTIVE:   Patient stated My friend will be helping me at home.       OBJECTIVE DATA SUMMARY:   Critical Behavior:  Neurologic State: Alert, Appropriate for age  Orientation Level: Oriented X4  Cognition: Appropriate decision making, Appropriate for age attention/concentration, Appropriate safety awareness, Follows commands     Range of Motion:           RLE AROM  R Knee Flexion: 96  R Knee Extension: 20              Functional Mobility Training:  Bed Mobility:  Rolling:  (in chair on arrival)                 Transfers:  Sit to Stand: Contact guard assistance  Stand to Sit: Contact guard assistance  Stand Pivot Transfers: Contact guard assistance     Bed to Chair: Contact guard assistance                    Balance:  Standing - Static: Good  Standing - Dynamic : Good  Ambulation/Gait Training:  Distance (ft): 150 Feet (ft)  Assistive Device: Gait belt;Walker, rolling  Ambulation - Level of Assistance: Contact guard assistance; Additional time;Assist x2        Gait Abnormalities: Antalgic (step through fluid gait pattern)        Base of Support: Narrowed (bilateral feet inverted)     Speed/Alyx: Accelerated        Stairs:NT           Therapeutic Exercises:     EXERCISE   Sets   Reps   Active Active Assist   Passive Self ROM   Comments   Ankle Pumps   10 [ ]                                        [ ]                                        [ ]                                        [ ]                                            Quad Sets   8 [ ]                                        [ ]                                        [ ]                                        [ ]                                            Hamstring Sets     [ ]                                        [ ]                                        [ ]                                        [ ]                                            Dorothe Severs     [ ] [ ]                                        [ ]                                        [ ]                                            Amber North   1   [ ]                                          [ ]                                          [ ]                                          [ ]                                            Leilani Baltazar     [ ]                                        [ ]                                        [ ]                                        [ ]                                            Claudeen Lavender     [ ]                                        [ ]                                        [ ]                                        [ ]                                            Knee Flexion Stretch   10 [ ]                                        [ ]                                        [ ]                                        [ ]                                            Yan Jackson     [ ]                                        [ ]                                        [ ]                                        [ ]                                               Pain:  Pain Scale 1: Numeric (0 - 10)  Pain Intensity 1: 0              Activity Tolerance:   good  Please refer to the flowsheet for vital signs taken during this treatment.   After treatment:   [X] Patient left in no apparent distress sitting up in chair  [ ] Patient left in no apparent distress in bed  [X] Call bell left within reach  [X] Nursing notified  [ ] Caregiver present  [ ] Bed alarm activated      COMMUNICATION/COLLABORATION:   The patients plan of care was discussed with: Registered Nurse     Milad Morejon PT   Time Calculation: 25 mins

## 2017-07-11 NOTE — INTERDISCIPLINARY ROUNDS
[address]    Patient Information    Name: Rosilyn Gaucher  Age: 71 y.o. Admission Date: 7/10/2017  Surgery/Procedure: Procedure(s):  RIGHT TOTAL KNEE REPLACEMENT (PRESS FIT) (GEN W/BLOCK)   Attending Provider: Jamie Kam MD  Surgeon: Mansoor Bellamy   Problem List: Active Problems:    OA (osteoarthritis) of knee (7/10/2017)      During rounds the following quality care indicators and evidence based practices were addressed :       PT/OT: Patient mobility - walked 140' with PT  Bed Mobility Training  Supine to Sit: Assist x1, Additional time, Moderate assistance  Sit to Supine: Assist x1, Additional time, Moderate assistance  Scooting: Supervision  Transfer Training  Sit to Stand: Assist x2, Additional time, Moderate assistance  Stand to Sit: Assist x2, Additional time, Minimum assistance  Bed to Chair: Assist x2, Additional time, Moderate assistance, Minimum assistance      Gait Training  Assistive Device: Gait belt, Walker, rolling  Ambulation - Level of Assistance: Assist x2, Minimal assistance  Distance (ft): 16 Feet (ft)   Weight Bearing Status  Right Side Weight Bearing: As tolerated      Pain Assessment  Pain Intensity 1: 0 (07/11/17 0527)  Pain Location 1: Knee  Pain Intervention(s) 1: Ice, Position, Emotional support  Patient Stated Pain Goal: 4    Pain meds: oxyContin, oxycodone  Antibiotics completed:  Yes  Anticoagulation:  Xarelto    SCDs: in place  Bowels:     RRAT Score:      Readmit Risk Tool  Support Systems: Friends \ neighbors, Family member(s)  Relationship with Primary Physician Group: Seen at least one time within the past 6 months    Discharge Management/Planning:    Readmit Risk Assessment Information:   Low Risk            12       Total Score        3 Relationship with PCP    4 More than 1 Admission in calendar year    5 Patient Insurance is Medicare, Medicaid or Self Pay        Criteria that do not apply:    Patient Living Status    Patient Length of Stay > 5 Charlson Comorbidity Score         Readmit Risk Tool  Support Systems: Friends \ neighbors, Family member(s)  Relationship with Primary Physician Group: Seen at least one time within the past 6 months    [facility] [place], [place]:     Anticipated Date of Discharge: tomorrow    Interdisciplinary team rounds were held with the following team members: Nurse Practitioner, Case Management, Nursing, Pharmacy, and Rehab. See clinical pathway and/or care plan for interventions and desired outcomes.

## 2017-07-11 NOTE — DISCHARGE INSTRUCTIONS
Knee Surgery Discharge Instructions  Dr. Riggs Oar should be as active as you can tolerate within the guidelines you have been given. Perform the exercises you have been given twice daily. Use your walker or crutches as directed by your physician.  Ice: Application of ice will prevent and treat inflammation. You should use ice at least three times a day for 20 minutes.  Dressing Changes & Showering:  Do not remove the clear, plastic dressing on your incision. This will be removed when you are seen at your follow-up appointment in Dr. Lieutenant Teresa' office. If the dressing is intact you may shower. Call the office immediately if there is any drainage from your dressing.  Compression Stockings:  Wear your compression stockings everyday for 4 weeks. You may remove them at night to wash and let them air dry.  You should rest for one hour twice a day with your feet elevated above your heart and your operative leg fully extended. Medications:       1. Pain:  You have been given a prescription for pain control. Take this medication as   directed. Do not take more than prescribed. If your pain is not controlled by the medication you were given, please call your surgeons office. Possible side effects of the medication include dizziness, headache, nausea, vomiting, constipation and urinary retention. If you experience any ofthese side effects call the office so that we can assist you in relieving them. Discontinue the use of the pain medication if you develop a rash, shortness of breath or difficulties swallowing. If these symptoms become severe or aren't relieved by discontinuing the medication you should seek immediate medical attention. You should not drive or operate machinery while taking this medication.    If you were prescribed Percocet (oxycodone/acetaminophen) or Norco/Lortab/Vicodin (hydrocodone/acetaminophen)  do not take Tylenol in addition to your pain medication, as these medications contain Tylenol. It is not safe for your liver to exceed 3000mg of Tylenol (acetaminophen) per day. If you were prescribed Roxicodone (oxycodone) or Dilaudid (hydromorphone) you should take Tylenol in addition. Do not exceed 3000mg of Tylenol per day. Refills of pain medication are authorized during office hours only   ( Monday to Friday 8am-5pm)        2. Blood Thinner:  You have been given a prescription for Xarelto 10 mg. Please take one tablet each day for thirty days. Do not take anti-inflammatory medication (Advil, Aleve, Motrin) while taking the blood thinner. If the pharmacy needs a \"prior authorization\" for this medication call the office IMMEDIATELY. It is imperative that you take this medication every day. 3. Stool Softeners: You should take stool softeners while taking pain medication. Pain medications can cause constipation. Stool softeners, warm prune juice and increasing your water and fiber intake can help prevent constipation. Do not take laxatives. 4. You should resume the medications you were taking prior to your surgery. Pain medication may change the effects of any antidepressant medication you are taking. If you have any questions about possible interactions between your regular medications and the pain medication you should consult the physician who prescribes your regular medications. Diet  You may resume your regular home diet. Physical Therapy:  You must begin outpatient physical therapy on Thursday or Friday. Your were given a prescription for therapy when you scheduled your surgery. If you do not have an appointment scheduled or a therapy prescription please call Dr. Indy Sosa' office.      APPOINTMENT:   [facility] 7-13 at 2:30pm (Remenber:  Therapy has moved to the Medical Office Building at the back of the [facility] parking lot)    Follow-Up Appointment:  Please follow up at your scheduled follow up appointment. If you do not have an appointment, please call Dr. Henrietta Escobar office to schedule an appointment for 7-10 days after surgery. Your appointment will likely be with Adrianna Lyons PA-C. Missael assists Dr. Oliverio Palacios in surgery. She will review your operation and answer any questions. APPOINTMENT:  7-24 at 1:20pm    Reasons to call your surgeon:   Fever above 101 for more than 12 hours which isn't relieved by an increase in fluid intake and taking Tylenol every 4-6 hours (this may be in pain medication)   Persistent pain in the calf of either leg   Pain uncontrolled by taking your pain medication as prescribed    A sudden increase in redness or swelling at the surgical site which is not relieved by rest, ice, and elevation   Drainage from your incision    Numbness, tingling, or change in your affected extremity   Shortness of breath, chest pain, nausea, vomiting   Any symptoms which are a concern to you    Unless you are having a true medical emergency, you MUST call Dr. Darren Wagner' office   BEFORE proceeding to the Emergency Department. A provider is on call 24 hours/day. Exercises after Knee Surgery  1. Knee Extension:                                                           Instructions:  - Sit on the edge of a chair and prop the heel of your surgical leg on a chair in front of you  - Place a 10 lb bag of rice on top of your knee and hold this position as long as possible  - You may also place your hands on top of your knee to help add more pressure  - You should perform this exercises as many times as possible throughout the day  o At least 10 times each hour while awake  Remember:  - It is imperative to have full extension, 0 degrees, within the first 2 weeks after surgery     2.  Knee Flexion:                                                Instructions:  - Place the ankle of your nonsurgical leg on top of the ankle of your surgical leg  - Use your good leg to help bend your surgical leg  - You should bend to at least 90 degrees   - You should perform this exercises as many times as possible throughout the day  o At least 10 times each hour while awake   Remember:  - Your main goal is to obtain full range of motion as quickly as possible after surgery  o During surgery, Dr. Ric Sotomayor will make sure that your new knee will bend and straighten completely. o We ask that you work hard to keep this motion  - You should never sit with your knee in a half bent position until you are told otherwise by Dr. Ric Sotomayor. Do not sit in a recliner chair.

## 2017-07-12 VITALS
RESPIRATION RATE: 18 BRPM | HEIGHT: 62 IN | HEART RATE: 67 BPM | SYSTOLIC BLOOD PRESSURE: 105 MMHG | TEMPERATURE: 98.4 F | BODY MASS INDEX: 26.57 KG/M2 | DIASTOLIC BLOOD PRESSURE: 58 MMHG | WEIGHT: 144.4 LBS | OXYGEN SATURATION: 94 %

## 2017-07-12 LAB
ANION GAP BLD CALC-SCNC: 6 MMOL/L (ref 5–15)
BUN SERPL-MCNC: 14 MG/DL (ref 6–20)
BUN/CREAT SERPL: 16 (ref 12–20)
CALCIUM SERPL-MCNC: 8.4 MG/DL (ref 8.5–10.1)
CHLORIDE SERPL-SCNC: 108 MMOL/L (ref 97–108)
CO2 SERPL-SCNC: 25 MMOL/L (ref 21–32)
CREAT SERPL-MCNC: 0.89 MG/DL (ref 0.55–1.02)
GLUCOSE SERPL-MCNC: 100 MG/DL (ref 65–100)
HGB BLD-MCNC: 10.7 G/DL (ref 11.5–16)
POTASSIUM SERPL-SCNC: 3.9 MMOL/L (ref 3.5–5.1)
SODIUM SERPL-SCNC: 139 MMOL/L (ref 136–145)

## 2017-07-12 PROCEDURE — 97165 OT EVAL LOW COMPLEX 30 MIN: CPT

## 2017-07-12 PROCEDURE — 85018 HEMOGLOBIN: CPT | Performed by: ORTHOPAEDIC SURGERY

## 2017-07-12 PROCEDURE — 97535 SELF CARE MNGMENT TRAINING: CPT

## 2017-07-12 PROCEDURE — 80048 BASIC METABOLIC PNL TOTAL CA: CPT | Performed by: ORTHOPAEDIC SURGERY

## 2017-07-12 PROCEDURE — 97116 GAIT TRAINING THERAPY: CPT

## 2017-07-12 PROCEDURE — 74011250637 HC RX REV CODE- 250/637: Performed by: STUDENT IN AN ORGANIZED HEALTH CARE EDUCATION/TRAINING PROGRAM

## 2017-07-12 PROCEDURE — 74011250637 HC RX REV CODE- 250/637: Performed by: ORTHOPAEDIC SURGERY

## 2017-07-12 PROCEDURE — 36415 COLL VENOUS BLD VENIPUNCTURE: CPT | Performed by: ORTHOPAEDIC SURGERY

## 2017-07-12 PROCEDURE — 74011250637 HC RX REV CODE- 250/637: Performed by: ANESTHESIOLOGY

## 2017-07-12 PROCEDURE — 97110 THERAPEUTIC EXERCISES: CPT

## 2017-07-12 RX ADMIN — OXYCODONE HYDROCHLORIDE 5 MG: 5 TABLET ORAL at 08:41

## 2017-07-12 RX ADMIN — Medication 10 ML: at 06:15

## 2017-07-12 RX ADMIN — ACETAMINOPHEN 650 MG: 325 TABLET ORAL at 12:30

## 2017-07-12 RX ADMIN — DOCUSATE SODIUM -SENNOSIDES 1 TABLET: 50; 8.6 TABLET, COATED ORAL at 08:39

## 2017-07-12 RX ADMIN — OXYCODONE HYDROCHLORIDE 5 MG: 5 TABLET ORAL at 12:30

## 2017-07-12 RX ADMIN — ACETAMINOPHEN 650 MG: 325 TABLET ORAL at 06:15

## 2017-07-12 RX ADMIN — OXYCODONE HYDROCHLORIDE 5 MG: 5 TABLET ORAL at 06:15

## 2017-07-12 RX ADMIN — FAMOTIDINE 20 MG: 20 TABLET, FILM COATED ORAL at 08:39

## 2017-07-12 RX ADMIN — POLYETHYLENE GLYCOL 3350 17 G: 17 POWDER, FOR SOLUTION ORAL at 08:38

## 2017-07-12 RX ADMIN — CELECOXIB 200 MG: 100 CAPSULE ORAL at 08:39

## 2017-07-12 RX ADMIN — ROSUVASTATIN CALCIUM 5 MG: 10 TABLET, FILM COATED ORAL at 08:38

## 2017-07-12 NOTE — PROGRESS NOTES
Bedside and Verbal shift change report given to MICHAELA Valenzuela (oncoming nurse) by Deidre Morin RN (offgoing nurse). Report included the following information SBAR, Kardex, Procedure Summary and Intake/Output.

## 2017-07-12 NOTE — PROGRESS NOTES
Problem: Mobility Impaired (Adult and Pediatric)  Goal: *Acute Goals and Plan of Care (Insert Text)  Physical Therapy Goals  Initiated 7/10/2017    1. Patient will move from supine to sit and sit to supine , scoot up and down and roll side to side in bed with independence within 4 days. 2. Patient will perform sit to stand with modified independence within 4 days. 3. Patient will ambulate with modified independence for 150 feet with the least restrictive device within 4 days. 4. Patient will perform home exercise program per protocol with independence within 4 days. 5. Patient will demonstrate AROM 0-90 degrees in operative joint within 4 days. PHYSICAL THERAPY TREATMENT  Patient: Misbah Adjutant (30 y.o. female)  Date: 7/12/2017  Diagnosis: DJD  OA (osteoarthritis) of knee <principal problem not specified>  Procedure(s) (LRB):  RIGHT TOTAL KNEE REPLACEMENT (PRESS FIT) (GEN W/BLOCK)  (Right) 2 Days Post-Op  Precautions: Fall (On Q)      ASSESSMENT: Patient's treatment session interrupted so completed in two short sessions with patient. Patient anxious to work with PT. Patient demonstrated independence with TKR exercises and awareness of importance of proper positioning of right knee. Patient's friend who will be her caregiver at home observed tx and verbalized good understanding. Patient has ramps to enter he home but demonstrated ability to ascend and descend 4 steps with use of handrails. Patient cleared for discharge from PT stand point. Progression toward goals:  [X]      Improving appropriately and progressing toward goals  [ ]      Improving slowly and progressing toward goals  [ ]      Not making progress toward goals and plan of care will be adjusted       PLAN:  Patient continues to benefit from skilled intervention to address the above impairments. Continue treatment per established plan of care.   Discharge Recommendations:  Outpatient  Further Equipment Recommendations for Discharge:  none SUBJECTIVE:   Patient stated I am ready to go.       OBJECTIVE DATA SUMMARY:   Critical Behavior:  Neurologic State: Alert  Orientation Level: Oriented X4  Cognition: Appropriate decision making, Appropriate for age attention/concentration, Appropriate safety awareness  Safety/Judgement: Awareness of environment, Good awareness of safety precautions  Range of Motion:           RLE AROM  R Knee Flexion: 92  R Knee Extension: 18              Functional Mobility Training:  Bed Mobility:  Rolling:  (pt was received sitting EOB and remained up)  Supine to Sit: Modified independent  Sit to Supine: Modified independent  Scooting: Independent        Transfers:  Sit to Stand: Supervision  Stand to Sit: Supervision  Stand Pivot Transfers: Supervision     Bed to Chair: Supervision                    Balance:  Sitting: Intact  Standing: Intact; With support  Standing - Static: Constant support;Good  Standing - Dynamic : Good  Ambulation/Gait Training:  Distance (ft): 125 Feet (ft)  Assistive Device: Gait belt;Walker, rolling  Ambulation - Level of Assistance: Contact guard assistance        Gait Abnormalities: Antalgic;Decreased step clearance        Base of Support: Narrowed  Stance: Left decreased;Right decreased  Speed/Alyx: Accelerated  Step Length: Left shortened;Right shortened                    Therapeutic Exercises:     EXERCISE   Sets   Reps   Active Active Assist   Passive Self ROM   Comments   Ankle Pumps   10 [ ]                                        [ ]                                        [ ]                                        [ ]                                            Quad Sets   8 [ ]                                        [ ]                                        [ ]                                        [ ]                                            Hamstring Sets   8 [ ]                                        [ ]                                        [ ] [ ]                                            Ritchie Watson     [ ]                                        [ ]                                        [ ]                                        [ ]                                            Kylah Ailin   3   [ ]                                          [ ]                                          [ ]                                          [ ]                                            Michael Walker     [ ]                                        [ ]                                        [ ]                                        [ ]                                            Javier Lanes     [ ]                                        [ ]                                        [ ]                                        [ ]                                            Knee Flexion Stretch   3 [ ]                                        [ ]                                        [ ]                                        [ ]                                            Straight Leg Raises   8 [ ]                                        [ ]                                        [ ]                                        [ ]                                               Pain:  Pain Scale 1: Numeric (0 - 10)  Pain Intensity 1: 3  Pain Location 1: Knee  Pain Orientation 1: Right  Pain Description 1: Aching  Pain Intervention(s) 1: Medication (see MAR)  Activity Tolerance:   good  Please refer to the flowsheet for vital signs taken during this treatment.   After treatment:   [ ] Patient left in no apparent distress sitting up in chair  [X] Patient left in no apparent distress in bed  [X] Call bell left within reach  [X] Nursing notified  [X] Caregiver present  [ ] Bed alarm activated      COMMUNICATION/COLLABORATION:   The patients plan of care was discussed with: Registered Nurse     Stella Gonzalez, PT   Time Calculation: 20 mins

## 2017-07-12 NOTE — DISCHARGE SUMMARY
Total Knee Replacement Home Discharge Summary    Patient ID:  Marium Murillo  1945  71 y.o.  [id number]    Admit date: 7/10/2017    Discharge date and time: No discharge date for patient encounter. Admitting Physician: Dennis Mace MD     Admission Diagnoses: DJD  OA (osteoarthritis) of knee    Discharge Diagnoses: Active Problems:    OA (osteoarthritis) of knee (7/10/2017)        Surgeon: Dennis Mace MD    HISTORY OF PRESENT ILLNESS:  Marium Murillo is a pleasant 71 y.o. long standing patient with advanced osteoarthritis. The patient failed all conservative management. Therefore, Dr. Kendall Funes and the patient decided the most appropriate plan of care is to proceed with a total knee arthroplasty, to be preformed at [facility]. All preoperative clearance was done prior to surgery. The patient's complete history and physical, laboratory data and physical exam is well documented in hospital chart. HOSPITAL COURSE:  Marium Murillo was admitted on7/10/2017 and underwent successful total knee arthroplasty. There were no complications intraoperatively and the patient was transferred to the orthopaedic floor in stable condition. A consultations was made to a primary care physician whom continued to monitor the patient throughout the patient's hospitalizations. Physical therapy and occupational therapy initiated their evaluation and treatment and continued to follow the patient until the patient was discharged. For pain control, a femoral nerve block was used, and was discontinued on post-operative day 2. The patient then was transitioned over oral narcotics in which was tolerated well. DVT prophylaxis was initiated on the day of surgery including; Xarelto, compression stockings and bilateral foot pumps. The incision site remained clean, dry, and intact. The patient remained neurovascularly intact.  At the time of discharge, the patient was able to ambulate safely, go up and down stairs and had an understanding of the explicit discharge precautions and instructions following surgery. Post Op complications: none    Current Discharge Medication List      CONTINUE these medications which have NOT CHANGED    Details   rosuvastatin (CRESTOR) 5 mg tablet Take 5 mg by mouth daily. cholecalciferol, vitamin D3, (VITAMIN D3) 2,000 unit tab Take 2,000 Units by mouth daily. OTHER Indications: Pt states she takes calcium not sure of dose         STOP taking these medications       meloxicam (MOBIC) 15 mg tablet Comments:   Reason for Stopping:               Discharged to: Home    Discharge instructions:  -Anticoagulate per med list.   -Resume pre hospital diet.            -Resume home medications per medication reconciliation form. -Prescriptions for pain medication and Xarelto were provided to the patient.     -Ambulate with an assisted device as instructed by PT/OT prior to discharge.   -Patient is to attend therapy as directed by physician.    -Patient is to work hard on full extension and full flexion throughout the day at home.  -First follow-up appointment to be scheduled in 10 days. -If patient is unaware of their appointment time/date, they are call the office at [phone].  -Explicit discharge instructions were provided to the patient.       Signed:  Lambert Mulligan MD  7/12/2017  11:00 AM

## 2017-07-12 NOTE — PROGRESS NOTES
Occupational Therapy EVALUATION/discharge  Patient: Aba Urena (75 y.o. female)  Date: 7/12/2017  Primary Diagnosis: DJD  OA (osteoarthritis) of knee  Procedure(s) (LRB):  RIGHT TOTAL KNEE REPLACEMENT (PRESS FIT) (GEN W/BLOCK)  (Right) 2 Days Post-Op   Precautions:   Fall, WBAT     ASSESSMENT:   Based on the objective data described below, the patient presents with good overall activity tolerance following R TKA on POD 2. PTA, patient lives and provides care for her son (SCI), manages all care independently,drives and remains active. Patient was educated on adaptive ADL techniques, use of adaptive equipment for improved safety + independence with ADLs, and safe transfer technique. Patient was received sitting EOB and required supervision for all OOB transfers using rolling walker. Patient was independent to mod I level for all ADLs. Patient was educated on general home safety and techniques for providing specific care for her son (cathing, bowel care, bathing); She confirms her son will have a caregiver to assist while she is recovering but would like to remain involved. Patient has no further skilled OT needs and is cleared from OT services at this time. Discharge Recommendations: none   Further Equipment Recommendations for Discharge: none       SUBJECTIVE:   Patient stated This is so nice to hear all of these tips; Thank you so much.     OBJECTIVE DATA SUMMARY:   HISTORY:   Past Medical History:   Diagnosis Date    Arthritis     osteopenia    Chronic pain     knees/hips    High cholesterol     Ill-defined condition 06/21/2017    Over weight  BMI= 27.1    Insomnia     Restless leg syndrome     Urinary incontinence     stress /urge     Past Surgical History:   Procedure Laterality Date    HX GI      colonoscopy    HX GYN      vaginal sling    HX HEENT      Tonsilectomy as a child    HX ORTHOPAEDIC      local anesthesia- Trigger fingers bilat hands       Prior Level of Function/Home Situation: Patient lives with her son. Patient reports independence with all care PTA. Expanded or extensive additional review of patient history:     Home Situation  Home Environment: Private residence  # Steps to Enter: 0  One/Two Story Residence: One story  Living Alone: No  Support Systems: Family member(s)  Patient Expects to be Discharged to[de-identified] Private residence  Current DME Used/Available at Home: Alexander Pierson, johanna, [address] chair, Grab bars, Commode, bedside  Tub or Shower Type: Shower  [x]  Right hand dominant   []  Left hand dominant    EXAMINATION OF PERFORMANCE DEFICITS:  Cognitive/Behavioral Status:  Neurologic State: Alert  Orientation Level: Oriented X4  Cognition: Appropriate decision making; Appropriate for age attention/concentration; Appropriate safety awareness  Perception: Appears intact  Perseveration: No perseveration noted  Safety/Judgement: Awareness of environment;Good awareness of safety precautions    Skin: Intact in the uppers    Edema: None noted in the uppers    Hearing: Auditory  Auditory Impairment: None    Vision/Perceptual:    Tracking: Able to track stimulus in all quadrants w/o difficulty    Diplopia: No    Acuity: Within Defined Limits       Range of Motion:  WDL in the uppers     Strength:  WDL in the uppers    Coordination:  Fine Motor Skills-Upper: Left Intact; Right Intact    Gross Motor Skills-Upper: Left Intact; Right Intact    Tone & Sensation:  Tone: normal  Sensation: intact    Balance:  Sitting: Intact  Standing: Intact; With support    Functional Mobility and Transfers for ADLs:  Bed Mobility:  Rolling:  (pt was received sitting EOB and remained up)  Scooting: Independent    Transfers:  Sit to Stand: Supervision  Stand to Sit: Supervision  Bed to Chair: Supervision  Toilet Transfer : Supervision    ADL Assessment:  Feeding: Independent    Oral Facial Hygiene/Grooming: Independent    Bathing: Modified independent    Upper Body Dressing: Independent    Lower Body Dressing: Modified independent    Toileting: Modified independent       Bathing: Patient instructed when bathing to not submerge wound in water, stand to shower or sponge bathe, cover wound with plastic and tape to ensure no water reaches bandage/wound without cues. Patient indicated understanding. Advised to get clearance from MD prior to bathing. Dressing joint: Patient instructed to don/doff right LE first/last.  Patient instructed to don all clothing while sitting prior to standing, doff all clothing to knees while standing, then sit to doff clothing off from knees to feet in order to facilitate fall prevention, pain management, and energy conservation. Patient indicated understanding/recalled strategies with min cues. Dressing joint reach exercise: To increase independence with lower body dressing, patient instructed to reach down right LE in a seated position slowly to prevent tearing/shearing until slight pull is felt, hold at end range for 10 seconds, then return to starting upright position. Patient instructed to complete three sets of three repetitions each daily. Patient demonstrated understanding. Home safety: Patient instructed on home modifications and safety (raise height of ADL objects, appropriate height of chair surfaces, recliner safety, change of floor surfaces, clear pathways) to increase independence and fall prevention. Patient indicated understanding. Standing: Patient instructed to walk up to sink/counter top/surfaces, step into walker to increase safety of joint and fall prevention. Patient educated about knee anatomy verbally and with pictures and educated to avoid rotation of right LE. Instructed to apply concept to ADLs within the home (no reaching across body, square off while using objects, slide objects along surfaces).   Patient instructed to increase amount of time standing, observe standing position during ADLs in order to increase even weight bearing through bilateral LEs in order to increase independence with ADLs. Goal to be reached 30 days post - op, per orthopedic surgeon or per PT. Patient indicated understanding. Cognitive Retraining  Safety/Judgement: Awareness of environment;Good awareness of safety precautions    Functional Measure:  Barthel Index:    Bathin  Bladder: 5  Bowels: 10  Groomin  Dressing: 10  Feeding: 10  Mobility: 10  Stairs: 5  Toilet Use: 10  Transfer (Bed to Chair and Back): 10  Total: 80       Barthel and G-code impairment scale:  Percentage of impairment CH  0% CI  1-19% CJ  20-39% CK  40-59% CL  60-79% CM  80-99% CN  100%   Barthel Score 0-100 100 99-80 79-60 59-40 20-39 1-19   0   Barthel Score 0-20 20 17-19 13-16 9-12 5-8 1-4 0      The Barthel ADL Index: Guidelines  1. The index should be used as a record of what a patient does, not as a record of what a patient could do. 2. The main aim is to establish degree of independence from any help, physical or verbal, however minor and for whatever reason. 3. The need for supervision renders the patient not independent. 4. A patient's performance should be established using the best available evidence. Asking the patient, friends/relatives and nurses are the usual sources, but direct observation and common sense are also important. However direct testing is not needed. 5. Usually the patient's performance over the preceding 24-48 hours is important, but occasionally longer periods will be relevant. 6. Middle categories imply that the patient supplies over 50 per cent of the effort. 7. Use of aids to be independent is allowed. Louisa Prince., Barthel, D.W. (7360). Functional evaluation: the Barthel Index. [facility] (14)2. ДМИТРИЙ Aguilera, Uri Becker., Allan Crisostomo, [place], [address] (). Measuring the change indisability after inpatient rehabilitation; comparison of the responsiveness of the Barthel Index and Functional [place] Measure.  Journal of Neurology, Neurosurgery, and Psychiatry, 66(4), 428-217. ALLISON Mendez, SARAH Goyal, & Rai Dolan M.A. (2004.) Assessment of post-stroke quality of life in cost-effectiveness studies: The usefulness of the Barthel Index and the EuroQoL-5D. Quality of Life Research, 13, 702-26       G codes: In compliance with CMSs Claims Based Outcome Reporting, the following G-code set was chosen for this patient based on their primary functional limitation being treated: The outcome measure chosen to determine the severity of the functional limitation was the Barthel Index with a score of 80/100 which was correlated with the impairment scale. ? Self Care:     - CURRENT STATUS: CI - 1%-19% impaired, limited or restricted    - GOAL STATUS: CI - 1%-19% impaired, limited or restricted    - D/C STATUS:  CI - 1%-19% impaired, limited or restricted       Occupational Therapy Evaluation Charge Determination   History Examination Decision-Making   LOW Complexity : Brief history review  LOW Complexity : 1-3 performance deficits relating to physical, cognitive , or psychosocial skils that result in activity limitations and / or participation restrictions  LOW Complexity : No comorbidities that affect functional and no verbal or physical assistance needed to complete eval tasks       Based on the above components, the patient evaluation is determined to be of the following complexity level: LOW   Activity Tolerance:   Patient tolerated eval well. After treatment:   [x]  Patient left in no apparent distress sitting up in chair  []  Patient left in no apparent distress in bed  [x]  Call bell left within reach  [x]  Nursing notified  []  Caregiver present  []  Bed alarm activated    COMMUNICATION/EDUCATION:   Communication/Collaboration:  [x]      Home safety education was provided and the patient/caregiver indicated understanding.   [x]      Patient/family have participated as able and agree with findings and recommendations. []      Patient is unable to participate in plan of care at this time. Findings and recommendations were discussed with: Physical Therapist, Registered Nurse and patient.     Meggan Patel OTR/L  Time Calculation: 36 mins

## 2017-07-12 NOTE — PROGRESS NOTES
[facility]                                                        PROGRESS NOTE     Date: 7/12/2017  PCP: Soo Lopez MD    Assessment/Plan:   Brenda Yoo is a 71 y.o. female who is  h/o hyperlipemia, pre-diabetes, and OA of right knee who is s/p right total knee replacement. The Family Medicine Service was consulted for medical management.     Right OA POD#2 s/p Right TKA - Patient doing well post-op. Pain well controlled. Tolerating regular diet.  -Pain management, therapy, and disposition planning per orthopedics. Borderline low BP: Stable. /60s in the last 24 hrs. Orthostatics negative. - VS per protocol     Pre-Diabetes: Glucose 100 this am. Last HgA1c on 6/21/17 was 6.4%. - Daily BMP  - Diabetic diet     Hyperlipidemia: No lipid panel on record. - Continue home rosuvastatin 5mg daily  - F/U with PCP     FEN/GI - Per Primary team  Activity - Out of bed with assistance  DVT prophylaxis - Per primary team  GI prophylaxis - Not indicated at this time  Disposition - Per primary team, stable for d/c from medical standpoint  Code Status - Full, discussed with patient / caregivers. Subjective:   CC: No complaints    Brenda Yoo was seen and examined at bedside. No acute events overnight. She denies any complaints. Says her care here has been A+++.     Patient denies dizziness, lightheadedness, fever, chills, CP, SOB, abd pain, nausea, vomiting    Objective:   Physical examination  Visit Vitals    /62 (BP 1 Location: Left arm, BP Patient Position: At rest)    Pulse 65    Temp 98.4 °F (36.9 °C)    Resp 18    Ht 5' 2\" (1.575 m)    Wt 144 lb 6.4 oz (65.5 kg)    SpO2 96%    Breastfeeding No    BMI 26.41 kg/m2     O2 Delivery: RA       Intake/Output Summary (Last 24 hours) at 07/12/17 0783  Last data filed at 07/12/17 0610   Gross per 24 hour   Intake 0 ml   Output             2850 ml   Net            -2850 ml       Gen: Alert, cooperative, no distress, pleasant  ENT: Moist mucus membranes  Heart: Regular rhythm, normal heart rate, no murmurs  Lungs: Normal chest wall and respirations. Clear to auscultation.   Extrem: Knee bent to 110 degrees while sitting in chair, minimal edema of right knee, no erythema or warmth, bilateral ankles and feet without edema, intact dp pulses bilat    Data Review:   Labs Reviewed:   Recent Results (from the past 24 hour(s))   METABOLIC PANEL, BASIC    Collection Time: 07/12/17  2:43 AM   Result Value Ref Range    Sodium 139 136 - 145 mmol/L    Potassium 3.9 3.5 - 5.1 mmol/L    Chloride 108 97 - 108 mmol/L    CO2 25 21 - 32 mmol/L    Anion gap 6 5 - 15 mmol/L    Glucose 100 65 - 100 mg/dL    BUN 14 6 - 20 MG/DL    Creatinine 0.89 0.55 - 1.02 MG/DL    BUN/Creatinine ratio 16 12 - 20      GFR est AA >60 >60 ml/min/1.73m2    GFR est non-AA >60 >60 ml/min/1.73m2    Calcium 8.4 (L) 8.5 - 10.1 MG/DL   HEMOGLOBIN    Collection Time: 07/12/17  2:43 AM   Result Value Ref Range    HGB 10.7 (L) 11.5 - 16.0 g/dL       Medications Reviewed:   Current Facility-Administered Medications   Medication Dose Route Frequency    rivaroxaban (XARELTO) tablet 10 mg  10 mg Oral Q24H    traMADol (ULTRAM) tablet 50 mg  50 mg Oral Q4H PRN    sodium chloride (NS) flush 5-10 mL  5-10 mL IntraVENous Q8H    sodium chloride (NS) flush 5-10 mL  5-10 mL IntraVENous PRN    oxyCODONE IR (ROXICODONE) tablet 5 mg  5 mg Oral Q3H PRN    oxyCODONE IR (ROXICODONE) tablet 10 mg  10 mg Oral Q3H PRN    naloxone (NARCAN) injection 0.4 mg  0.4 mg IntraVENous PRN    ondansetron (ZOFRAN) injection 4 mg  4 mg IntraVENous Q4H PRN    hydrOXYzine HCl (ATARAX) tablet 10 mg  10 mg Oral Q8H PRN    famotidine (PEPCID) tablet 20 mg  20 mg Oral DAILY    senna-docusate (PERICOLACE) 8.6-50 mg per tablet 1 Tab  1 Tab Oral BID    polyethylene glycol (MIRALAX) packet 17 g  17 g Oral DAILY    bisacodyl (DULCOLAX) suppository 10 mg  10 mg Rectal DAILY PRN    celecoxib (CELEBREX) capsule 200 mg  200 mg Oral Q12H    acetaminophen (TYLENOL) tablet 650 mg  650 mg Oral Q6H    rosuvastatin (CRESTOR) tablet 5 mg  5 mg Oral DAILY       Signed:   Elo Han   Resident, Family Medicine    Attending note:    [facility] attending note to follow

## 2017-07-12 NOTE — PROGRESS NOTES
Ortho Progress Note     Patient: Marium Murillo MRN: [id number]  SSN: [id number]    YOB: 1945  Age: 71 y.o. Sex: female      POD:    2 Days Post-Op  S/P:    Procedure(s):  RIGHT TOTAL KNEE REPLACEMENT (PRESS FIT) (GEN W/BLOCK)      Subjective:   Marium Murillo is a 71 y.o. female who is resting in bed with an appropriate level of post-operative knee pain. She is 2 Days Post-Op s/p Procedure(s):  RIGHT TOTAL KNEE REPLACEMENT (PRESS FIT) (GEN W/BLOCK) . Objective  Objective:   Patient Vitals for the past 12 hrs:   BP Temp Pulse Resp SpO2   07/12/17 0413 103/62 98.4 °F (36.9 °C) 65 18 96 %   07/11/17 2314 99/61 98.6 °F (37 °C) 70 18 94 %   07/11/17 2024 104/63 98.5 °F (36.9 °C) 70 18 100 %     Recent Labs      07/12/17   0243   HGB  10.7*   NA  139   K  3.9   CL  108   CO2  25   BUN  14   CREA  0.89   GLU  100       Alert and oriented x3. Musculoskeletal:  The operative knee dressing is clean/dry/intact. Bilateral calves are soft and nontender with a negative Homans sign. Dorsiflexion and plantar flexion are intact. DP/PT pulses are +2. The patient is neurovascularly intact.     Gait  Base of Support: Narrowed  Speed/Alyx: Accelerated  Step Length: Right shortened, Left shortened  Stance: Right decreased  Gait Abnormalities: Antalgic (step through fluid gait pattern)  Ambulation - Level of Assistance: Contact guard assistance, Additional time, Assist x2  Distance (ft): 150 Feet (ft)  Assistive Device: Gait belt, Walker, rolling       Patient mobility  Bed Mobility Training  Rolling:  (in chair on arrival)  Supine to Sit: Assist x1, Additional time, Moderate assistance  Sit to Supine: Assist x1, Additional time, Moderate assistance  Scooting: Supervision  Transfer Training  Sit to Stand: Contact guard assistance  Stand to Sit: Contact guard assistance  Bed to Chair: Contact guard assistance      Gait Training  Assistive Device: Gait belt, Walker, rolling  Ambulation - Level of Assistance: Contact guard assistance, Additional time, Assist x2  Distance (ft): 150 Feet (ft)   Weight Bearing Status  Right Side Weight Bearing: As tolerated         Assessment:     Assessment:     Patient Active Problem List   Diagnosis Code    OA (osteoarthritis) of knee M17.10    s/p Procedure(s):  RIGHT TOTAL KNEE REPLACEMENT (PRESS FIT) (GEN W/BLOCK)        Plan:   1. We will have the patient work with formalized physical therapy and occupational therapy to improve ambulation and range of motion and we will progress therapy to stair climbing as tolerated. 2. The patient will continue the current pain management regiment with oral narcotics. 3. We will continue DVT prophalaxis. 4. Zoraida Love will be discharged home if cleared by physical therapy. Explicit discharge instructions and prescriptions for narcotic pain medication and anticoagulation were provided to the patient. 5. Patient will attend scheduled therapy appointment POD 3 or 4.   6. The patient will follow-up in the office at the scheduled appointment 10-14 days from surgery.         Mary Skinner MD

## 2018-12-24 NOTE — PROGRESS NOTES
Bedside and Verbal shift change report given to Ramses Silva RN (oncoming nurse) by Erica Ahumada RN (offgoing nurse). Report included the following information SBAR and Kardex. 0

## 2019-03-29 ENCOUNTER — HOSPITAL ENCOUNTER (OUTPATIENT)
Dept: MRI IMAGING | Age: 74
Discharge: HOME | End: 2019-03-29
Attending: OTOLARYNGOLOGY
Payer: MEDICARE

## 2019-03-29 DIAGNOSIS — H90.A22 SENSORINEURAL HEARING LOSS, UNILATERAL, LEFT EAR, WITH RESTRICTED HEARING ON THE CONTRALATERAL SIDE: ICD-10-CM

## 2019-03-29 PROCEDURE — A9575 INJ GADOTERATE MEGLUMI 0.1ML: HCPCS | Performed by: RADIOLOGY

## 2019-03-29 PROCEDURE — 70553 MRI BRAIN STEM W/O & W/DYE: CPT

## 2019-03-29 PROCEDURE — 74011250636 HC RX REV CODE- 250/636: Performed by: RADIOLOGY

## 2019-03-29 RX ORDER — GADOTERATE MEGLUMINE 376.9 MG/ML
12 INJECTION INTRAVENOUS
Status: COMPLETED | OUTPATIENT
Start: 2019-03-29 | End: 2019-03-29

## 2019-03-29 RX ADMIN — GADOTERATE MEGLUMINE 12 ML: 376.9 INJECTION INTRAVENOUS at 15:31

## (undated) DEVICE — ZIMMER® STERILE DISPOSABLE TOURNIQUET CUFF WITH PROTECTIVE SLEEVE AND PLC, DUAL PORT, SINGLE BLADDER, 34 IN. (86 CM)

## (undated) DEVICE — STERILE POLYISOPRENE POWDER-FREE SURGICAL GLOVES: Brand: PROTEXIS

## (undated) DEVICE — NDL PRT INJ NSAF BLNT 18GX1.5 --

## (undated) DEVICE — T4 HOOD

## (undated) DEVICE — SUTURE MCRYL SZ 3-0 L27IN ABSRB UD L19MM PS-2 3/8 CIR PRIM Y427H

## (undated) DEVICE — DEVON™ KNEE AND BODY STRAP 60" X 3" (1.5 M X 7.6 CM): Brand: DEVON

## (undated) DEVICE — GOWN,BREATHABLE,IMP SLV 3XL AURORA: Brand: MEDLINE

## (undated) DEVICE — SYSTEM SKIN CLSR 22CM DERMBND PRINEO

## (undated) DEVICE — STERILE POLYISOPRENE POWDER-FREE SURGICAL GLOVES WITH EMOLLIENT COATING: Brand: PROTEXIS

## (undated) DEVICE — DUAL CUT SAGITTAL BLADE

## (undated) DEVICE — BOOT ORTH XL KNEE GRN TYVEK HI CVR SKID RESIST HEAT SEAL E

## (undated) DEVICE — PLUS HANDPIECE WITH SUCTION TUBING AND TRAUMA TIP WITH SMALL SOFT CONE: Brand: SURGILAV

## (undated) DEVICE — YANKAUER OPEN TIP, NO VENT: Brand: ARGYLE

## (undated) DEVICE — (D)PREP SKN CHLRAPRP APPL 26ML -- CONVERT TO ITEM 371833

## (undated) DEVICE — SMOKE EVACUATION PENCIL: Brand: VALLEYLAB

## (undated) DEVICE — INFECTION CONTROL KIT SYS

## (undated) DEVICE — CEMENT MIXING SYSTEM WITH FEMORAL BREAKWAY NOZZLE: Brand: REVOLUTION

## (undated) DEVICE — Device

## (undated) DEVICE — SUTURE STRATAFIX SYMMETRIC PDS + SZ 1 L18IN ABSRB VLT L48MM SXPP1A400

## (undated) DEVICE — STERILE HOOK LOCK ELASTIC BANDAGE 6IN X 10 YARD: Brand: HOOK LOCK™

## (undated) DEVICE — SUTURE VCRL SZ 2-0 L18IN ABSRB UD L26MM CP-2 1/2 CIR REV J762D

## (undated) DEVICE — DRAPE,REIN 53X77,STERILE: Brand: MEDLINE

## (undated) DEVICE — SUTURE VCRL + SZ 1-0 L36IN ABSRB UD CTX 1/2 CIR TAPR PNT VCP977H

## (undated) DEVICE — COVER LT HNDL BLU PLAS

## (undated) DEVICE — PADDING CAST W6INXL4YD POLY POR SPUN DACRON SYN VERSATILE

## (undated) DEVICE — SOLUTION IRRIG 3000ML 0.9% SOD CHL FLX CONT 0797208] ICU MEDICAL INC]

## (undated) DEVICE — SKIN MARKER,REGULAR TIP WITH RULER AND LABELS: Brand: DEVON

## (undated) DEVICE — 4-PORT MANIFOLD: Brand: NEPTUNE 2

## (undated) DEVICE — 3M™ IOBAN™ 2 ANTIMICROBIAL INCISE DRAPE 6651EZ: Brand: IOBAN™ 2

## (undated) DEVICE — REM POLYHESIVE ADULT PATIENT RETURN ELECTRODE: Brand: VALLEYLAB